# Patient Record
Sex: FEMALE | Employment: UNEMPLOYED | URBAN - METROPOLITAN AREA
[De-identification: names, ages, dates, MRNs, and addresses within clinical notes are randomized per-mention and may not be internally consistent; named-entity substitution may affect disease eponyms.]

---

## 2017-01-09 DIAGNOSIS — Z12.31 VISIT FOR SCREENING MAMMOGRAM: ICD-10-CM

## 2017-06-12 ENCOUNTER — OFFICE VISIT (OUTPATIENT)
Dept: INTERNAL MEDICINE CLINIC | Age: 52
End: 2017-06-12

## 2017-06-12 VITALS
HEIGHT: 63 IN | SYSTOLIC BLOOD PRESSURE: 102 MMHG | BODY MASS INDEX: 20.55 KG/M2 | WEIGHT: 116 LBS | DIASTOLIC BLOOD PRESSURE: 66 MMHG | TEMPERATURE: 98.6 F | OXYGEN SATURATION: 96 % | RESPIRATION RATE: 14 BRPM | HEART RATE: 80 BPM

## 2017-06-12 DIAGNOSIS — Z00.00 ROUTINE GENERAL MEDICAL EXAMINATION AT A HEALTH CARE FACILITY: Primary | ICD-10-CM

## 2017-06-12 DIAGNOSIS — L24.7 CONTACT DERMATITIS AND ECZEMA DUE TO PLANT: ICD-10-CM

## 2017-06-12 DIAGNOSIS — J30.1 SEASONAL ALLERGIC RHINITIS DUE TO POLLEN: ICD-10-CM

## 2017-06-12 DIAGNOSIS — F41.0 PANIC ATTACK: ICD-10-CM

## 2017-06-12 DIAGNOSIS — Z12.11 COLON CANCER SCREENING: ICD-10-CM

## 2017-06-12 RX ORDER — TRIAMCINOLONE ACETONIDE 1 MG/G
CREAM TOPICAL
Qty: 45 G | Refills: 1 | Status: SHIPPED | OUTPATIENT
Start: 2017-06-12 | End: 2019-02-21 | Stop reason: ALTCHOICE

## 2017-06-12 RX ORDER — ALPRAZOLAM 0.5 MG/1
0.5 TABLET ORAL
Qty: 10 TAB | Refills: 0 | Status: SHIPPED | OUTPATIENT
Start: 2017-06-12 | End: 2018-02-26 | Stop reason: ALTCHOICE

## 2017-06-12 RX ORDER — HYDROCORTISONE ACETATE SUPPOSITORY 30 MG/1
1 SUPPOSITORY RECTAL
Qty: 12 SUPPOSITORY | Refills: 1 | Status: SHIPPED | OUTPATIENT
Start: 2017-06-12 | End: 2019-02-21 | Stop reason: ALTCHOICE

## 2017-06-12 NOTE — MR AVS SNAPSHOT
Visit Information Date & Time Provider Department Dept. Phone Encounter #  
 6/12/2017  8:45 AM Aditya Paz MD [address] [address] Internal Medicine [phone] Upcoming Health Maintenance Date Due COLONOSCOPY 10/29/1981 INFLUENZA AGE 9 TO ADULT 8/1/2017 PAP AKA CERVICAL CYTOLOGY 10/27/2018 BREAST CANCER SCRN MAMMOGRAM 12/5/2018 DTaP/Tdap/Td series (2 - Td) 6/6/2026 Allergies as of 6/12/2017  Review Complete On: 6/12/2017 By: Aditya Paz MD  
  
 Severity Noted Reaction Type Reactions Versed [Midazolam]  01/07/2013    Nausea and Vomiting Current Immunizations  Reviewed on 6/6/2016 Name Date Hepatitis A Vaccine 4/19/2007, 10/19/2006 Influenza Vaccine 10/15/2015 Tdap 6/6/2016 Not reviewed this visit You Were Diagnosed With   
  
 Codes Comments Routine general medical examination at a health care facility    -  Primary ICD-10-CM: Z00.00 ICD-9-CM: V70.0 Colon cancer screening     ICD-10-CM: Z12.11 ICD-9-CM: V76.51 Seasonal allergic rhinitis due to pollen     ICD-10-CM: J30.1 ICD-9-CM: 477.0 Contact dermatitis and eczema due to plant     ICD-10-CM: L24.7 ICD-9-CM: 692.6 Panic attack     ICD-10-CM: F41.0 ICD-9-CM: 300.01 Vitals BP Pulse Temp Resp Height(growth percentile) Weight(growth percentile) 102/66 80 98.6 °F (37 °C) (Oral) 14 5' 3.25\" (1.607 m) 116 lb (52.6 kg) LMP SpO2 BMI OB Status Smoking Status 02/13/2012 96% 20.39 kg/m2 Postmenopausal Never Smoker Vitals History BMI and BSA Data Body Mass Index Body Surface Area  
 20.39 kg/m 2 1.53 m 2 Preferred Pharmacy Pharmacy Name Phone [facility] [phone] Your Updated Medication List  
  
   
This list is accurate as of: 6/12/17  9:33 AM.  Always use your most recent med list.  
  
  
  
  
 ALPRAZolam 0.5 mg tablet Commonly known as:  Wellington Nagy Take 1 Tab by mouth three (3) times daily as needed for Anxiety. Max Daily Amount: 1.5 mg. Indications: anxiety FISH OIL 1,000 mg Cap Generic drug:  omega-3 fatty acids-vitamin e Take 1 Cap by mouth. Glucosamine &Chondroit-MV-Min3 939-313-82-0.5 mg Tab Take  by mouth. hydrocortisone 2.5 % rectal cream  
Commonly known as:  ANUSOL-HC Insert  into rectum two (2) times a day. hydrocortisone acetate 30 mg Supp Insert 1 Dose into rectum two (2) times daily as needed. multivitamin tablet Commonly known as:  ONE A DAY Take 1 Tab by mouth daily. triamcinolone acetonide 0.1 % topical cream  
Commonly known as:  KENALOG Apply  to affected area two (2) times daily as needed for Skin Irritation. use thin layer VITAMIN D3 1,000 unit tablet Generic drug:  cholecalciferol Take  by mouth daily. Prescriptions Printed Refills ALPRAZolam (XANAX) 0.5 mg tablet 0 Sig: Take 1 Tab by mouth three (3) times daily as needed for Anxiety. Max Daily Amount: 1.5 mg. Indications: anxiety Class: Print Route: Oral  
  
Prescriptions Sent to Pharmacy Refills  
 triamcinolone acetonide (KENALOG) 0.1 % topical cream 1 Sig: Apply  to affected area two (2) times daily as needed for Skin Irritation. use thin layer Class: Normal  
 Pharmacy: [address] [place] [address] Ph #: [phone] Route: Topical  
 hydrocortisone acetate 30 mg supp 1 Sig: Insert 1 Dose into rectum two (2) times daily as needed. Class: Normal  
 Pharmacy: [address] [place] [address] Ph #: [phone] Route: Rectal  
  
We Performed the Following CBC WITH AUTOMATED DIFF [02892 CPT(R)] LIPID PANEL [62694 CPT(R)] METABOLIC PANEL, COMPREHENSIVE [83936 CPT(R)] REFERRAL TO GASTROENTEROLOGY [RUO01 Custom] TSH RFX ON ABNORMAL TO FREE T4 [[id number] Custom] UA/M W/RFLX CULTURE, ROUTINE [[id number] Custom] Referral Information Referral ID Referred By Referred To  
  
 [id number] [address] [address] [place], [facility] Visits Status Start Date End Date 1 New Request 6/12/17 6/12/18 If your referral has a status of pending review or denied, additional information will be sent to support the outcome of this decision. Introducing [facility] & HEALTH SERVICES! Dear Oscar Choi: Thank you for requesting a Sanguine account. Our records indicate that you already have an active Sanguine account. You can access your account anytime at [email or web address] Did you know that you can access your hospital and ER discharge instructions at any time in Sanguine? You can also review all of your test results from your hospital stay or ER visit. Additional Information If you have questions, please visit the Frequently Asked Questions section of the Sanguine website at [email or web address] Remember, Sanguine is NOT to be used for urgent needs. For medical emergencies, dial 911. Now available from your iPhone and Android! Please provide this summary of care documentation to your next provider. Your primary care clinician is listed as Willy [phone] If you have any questions after today's visit, please call [phone].

## 2017-06-12 NOTE — PROGRESS NOTES
Subjective:   46 y.o. female for Well Woman Check. Her gyne and breast care is done elsewhere by her Ob-Gyne physician. Patient Active Problem List    Diagnosis Date Noted    ACP (advance care planning) 06/06/2016    Allergic rhinitis 01/13/2014     Current Outpatient Prescriptions   Medication Sig Dispense Refill    triamcinolone acetonide (KENALOG) 0.1 % topical cream Apply  to affected area two (2) times daily as needed for Skin Irritation. use thin layer 45 g 1    ALPRAZolam (XANAX) 0.5 mg tablet Take 1 Tab by mouth three (3) times daily as needed for Anxiety. Max Daily Amount: 1.5 mg. Indications: anxiety 10 Tab 0    hydrocortisone acetate 30 mg supp Insert 1 Dose into rectum two (2) times daily as needed. 12 Suppository 1    hydrocortisone (ANUSOL-HC) 2.5 % rectal cream Insert  into rectum two (2) times a day. 30 g 3    cholecalciferol (VITAMIN D3) 1,000 unit tablet Take  by mouth daily.  omega-3 fatty acids-vitamin e (FISH OIL) 1,000 mg cap Take 1 Cap by mouth.  multivitamin (ONE A DAY) tablet Take 1 Tab by mouth daily.  Glucosamine &Chondroit-MV-Min3 223-926-41-0.5 mg tab Take  by mouth.        Allergies   Allergen Reactions    Versed [Midazolam] Nausea and Vomiting     Past Medical History:   Diagnosis Date    Allergic rhinitis, cause unspecified      Past Surgical History:   Procedure Laterality Date    HX REFRACTIVE SURGERY      SD EMBOLIZATION UTERINE FIBROID      REPAIR ACHILLES TENDON,PRIMARY       Family History   Problem Relation Age of Onset    Elevated Lipids Father     Allergic Rhinitis Son      Social History   Substance Use Topics    Smoking status: Never Smoker    Smokeless tobacco: Never Used    Alcohol use No        Lab Results  Component Value Date/Time   WBC 7.9 06/06/2016 09:58 AM   HGB 13.6 06/06/2016 09:58 AM   HCT 41.1 06/06/2016 09:58 AM   PLATELET 695 02/41/0865 09:58 AM   MCV 91 06/06/2016 09:58 AM     Lab Results  Component Value Date/Time Cholesterol, total 162 03/23/2015 10:26 AM   HDL Cholesterol 64 03/23/2015 10:26 AM   LDL, calculated 83 03/23/2015 10:26 AM   Triglyceride 75 03/23/2015 10:26 AM   CHOL/HDL Ratio 2.3 09/13/2010 12:00 PM     Lab Results  Component Value Date/Time   ALT (SGPT) 24 06/06/2016 09:58 AM   AST (SGOT) 25 06/06/2016 09:58 AM   Alk. phosphatase 93 06/06/2016 09:58 AM   Bilirubin, total 0.4 06/06/2016 09:58 AM   Albumin 4.8 06/06/2016 09:58 AM   Protein, total 8.5 06/06/2016 09:58 AM   PLATELET 069 34/76/3838 09:58 AM       Lab Results  Component Value Date/Time   GFR est non-AA 92 06/06/2016 09:58 AM   GFR est  06/06/2016 09:58 AM   Creatinine 0.75 06/06/2016 09:58 AM   BUN 15 06/06/2016 09:58 AM   Sodium 141 06/06/2016 09:58 AM   Potassium 4.3 06/06/2016 09:58 AM   Chloride 100 06/06/2016 09:58 AM   CO2 23 06/06/2016 09:58 AM     Lab Results  Component Value Date/Time   TSH 0.803 06/06/2016 09:58 AM   T4, Free 1.63 01/13/2014 10:57 AM      Lab Results   Component Value Date/Time    Glucose 87 06/06/2016 09:58 AM         Specific concerns today: Some itching on the arms since she was working in the yard last week. Ongoing hemorrhoid issues. , .    Review of Systems  A comprehensive review of systems was negative except for that written in the HPI. Some issues with anxiety off and on. Panic about once per year. Objective:   Blood pressure 102/66, pulse 80, temperature 98.6 °F (37 °C), temperature source Oral, resp. rate 14, height 5' 3.25\" (1.607 m), weight 116 lb (52.6 kg), last menstrual period 02/13/2012, SpO2 96 %.    Physical Examination:   General appearance - alert, well appearing, and in no distress  Eyes - pupils equal and reactive, extraocular eye movements intact  Ears - bilateral TM's and external ear canals normal  Nose - normal and patent, no erythema, discharge or polyps  Mouth - mucous membranes moist, pharynx normal without lesions  Neck - supple, no significant adenopathy  Lymphatics - no palpable lymphadenopathy, no hepatosplenomegaly  Chest - clear to auscultation, no wheezes, rales or rhonchi, symmetric air entry  Heart - normal rate, regular rhythm, normal S1, S2, no murmurs, rubs, clicks or gallops  Abdomen - soft, nontender, nondistended, no masses or organomegaly  Back exam - full range of motion, no tenderness, palpable spasm or pain on motion  Neurological - alert, oriented, normal speech, no focal findings or movement disorder noted, motor and sensory grossly normal bilaterally  Musculoskeletal - no joint tenderness, deformity or swelling  Extremities - peripheral pulses normal, no pedal edema, no clubbing or cyanosis  Skin - normal coloration and turgor, no rashes, no suspicious skin lesions noted  Dry irritated areas on the arms. Assessment/Plan:     continue present plan, routine labs ordered  Mal Fransisca was seen today for complete physical, skin problem and medication problem. Diagnoses and all orders for this visit:    Routine general medical examination at a health care facility  -     CBC WITH AUTOMATED DIFF  -     METABOLIC PANEL, COMPREHENSIVE  -     LIPID PANEL  -     TSH RFX ON ABNORMAL TO FREE T4  -     UA/M W/RFLX CULTURE, ROUTINE  -     REFERRAL TO GASTROENTEROLOGY    Colon cancer screening - long discussion about need to get her colonoscopy done. Seasonal allergic rhinitis due to pollen    Contact dermatitis and eczema due to plant - cream as needed    Panic attack - meds as needed    Other orders  -     triamcinolone acetonide (KENALOG) 0.1 % topical cream; Apply  to affected area two (2) times daily as needed for Skin Irritation. use thin layer  -     ALPRAZolam (XANAX) 0.5 mg tablet; Take 1 Tab by mouth three (3) times daily as needed for Anxiety. Max Daily Amount: 1.5 mg. Indications: anxiety  -     hydrocortisone acetate 30 mg supp; Insert 1 Dose into rectum two (2) times daily as needed. Follow-up Disposition: as needed and 12 months.     Advised her to call back or return to office if symptoms worsen/change/persist.  Discussed expected course/resolution/complications of diagnosis in detail with patient. Medication risks/benefits/costs/interactions/alternatives discussed with patient. She was given an after visit summary which includes diagnoses, current medications, & vitals. She expressed understanding with the diagnosis and plan.

## 2017-06-12 NOTE — PROGRESS NOTES
Chief Complaint   Patient presents with    Complete Physical    Skin Problem     itchy rash bilateral arms     Medication Problem     insurance will not cover Anusol      Goals that were addressed/or need to be completed after this visit:  Health Maintenance Due   Topic Date Due    COLONOSCOPY  10/29/1981     · Referral to colo placed.

## 2017-06-13 LAB
ALBUMIN SERPL-MCNC: 4.5 G/DL (ref 3.5–5.5)
ALBUMIN/GLOB SERPL: 1.3 {RATIO} (ref 1.2–2.2)
ALP SERPL-CCNC: 86 IU/L (ref 39–117)
ALT SERPL-CCNC: 14 IU/L (ref 0–32)
APPEARANCE UR: CLEAR
AST SERPL-CCNC: 19 IU/L (ref 0–40)
BACTERIA #/AREA URNS HPF: NORMAL /[HPF]
BASOPHILS # BLD AUTO: 0 X10E3/UL (ref 0–0.2)
BASOPHILS NFR BLD AUTO: 1 %
BILIRUB SERPL-MCNC: 0.3 MG/DL (ref 0–1.2)
BILIRUB UR QL STRIP: NEGATIVE
BUN SERPL-MCNC: 17 MG/DL (ref 6–24)
BUN/CREAT SERPL: 29 (ref 9–23)
CALCIUM SERPL-MCNC: 9.4 MG/DL (ref 8.7–10.2)
CASTS URNS QL MICRO: NORMAL /LPF
CHLORIDE SERPL-SCNC: 102 MMOL/L (ref 96–106)
CHOLEST SERPL-MCNC: 154 MG/DL (ref 100–199)
CO2 SERPL-SCNC: 23 MMOL/L (ref 18–29)
COLOR UR: YELLOW
CREAT SERPL-MCNC: 0.59 MG/DL (ref 0.57–1)
EOSINOPHIL # BLD AUTO: 0.1 X10E3/UL (ref 0–0.4)
EOSINOPHIL NFR BLD AUTO: 1 %
EPI CELLS #/AREA URNS HPF: NORMAL /HPF
ERYTHROCYTE [DISTWIDTH] IN BLOOD BY AUTOMATED COUNT: 13.3 % (ref 12.3–15.4)
GLOBULIN SER CALC-MCNC: 3.6 G/DL (ref 1.5–4.5)
GLUCOSE SERPL-MCNC: 93 MG/DL (ref 65–99)
GLUCOSE UR QL: NEGATIVE
HCT VFR BLD AUTO: 38.3 % (ref 34–46.6)
HDLC SERPL-MCNC: 60 MG/DL
HGB BLD-MCNC: 12.7 G/DL (ref 11.1–15.9)
HGB UR QL STRIP: NEGATIVE
IMM GRANULOCYTES # BLD: 0 X10E3/UL (ref 0–0.1)
IMM GRANULOCYTES NFR BLD: 0 %
KETONES UR QL STRIP: NEGATIVE
LDLC SERPL CALC-MCNC: 83 MG/DL (ref 0–99)
LEUKOCYTE ESTERASE UR QL STRIP: NEGATIVE
LYMPHOCYTES # BLD AUTO: 1.8 X10E3/UL (ref 0.7–3.1)
LYMPHOCYTES NFR BLD AUTO: 30 %
MCH RBC QN AUTO: 30.5 PG (ref 26.6–33)
MCHC RBC AUTO-ENTMCNC: 33.2 G/DL (ref 31.5–35.7)
MCV RBC AUTO: 92 FL (ref 79–97)
MICRO URNS: NORMAL
MICRO URNS: NORMAL
MONOCYTES # BLD AUTO: 0.5 X10E3/UL (ref 0.1–0.9)
MONOCYTES NFR BLD AUTO: 8 %
MUCOUS THREADS URNS QL MICRO: PRESENT
NEUTROPHILS # BLD AUTO: 3.5 X10E3/UL (ref 1.4–7)
NEUTROPHILS NFR BLD AUTO: 60 %
NITRITE UR QL STRIP: NEGATIVE
PH UR STRIP: 6 [PH] (ref 5–7.5)
PLATELET # BLD AUTO: 330 X10E3/UL (ref 150–379)
POTASSIUM SERPL-SCNC: 4.6 MMOL/L (ref 3.5–5.2)
PROT SERPL-MCNC: 8.1 G/DL (ref 6–8.5)
PROT UR QL STRIP: NEGATIVE
RBC # BLD AUTO: 4.16 X10E6/UL (ref 3.77–5.28)
RBC #/AREA URNS HPF: NORMAL /HPF
SODIUM SERPL-SCNC: 143 MMOL/L (ref 134–144)
SP GR UR: 1.03 (ref 1–1.03)
TRIGL SERPL-MCNC: 57 MG/DL (ref 0–149)
TSH SERPL DL<=0.005 MIU/L-ACNC: 0.55 UIU/ML (ref 0.45–4.5)
URINALYSIS REFLEX, 377202: NORMAL
UROBILINOGEN UR STRIP-MCNC: 0.2 MG/DL (ref 0.2–1)
VLDLC SERPL CALC-MCNC: 11 MG/DL (ref 5–40)
WBC # BLD AUTO: 5.9 X10E3/UL (ref 3.4–10.8)
WBC #/AREA URNS HPF: NORMAL /HPF

## 2018-01-04 ENCOUNTER — TELEPHONE (OUTPATIENT)
Dept: INTERNAL MEDICINE CLINIC | Age: 53
End: 2018-01-04

## 2018-01-04 NOTE — TELEPHONE ENCOUNTER
Patient would like to know who Dr. Ariana Terrell recommends she go to about her continuous nosebleeds -- pt states Dr. Ariana Terrell told her she could have it cauterized. Asked if Miss Ronal Montalvo could call her back.

## 2018-01-19 ENCOUNTER — TELEPHONE (OUTPATIENT)
Dept: INTERNAL MEDICINE CLINIC | Age: 53
End: 2018-01-19

## 2018-01-22 NOTE — TELEPHONE ENCOUNTER
Spoke with pt. Pt is worried that she has been having nose bleeds ~1x month. Usually happens when she is at work. She discussed this with SRJ at her appt and he suggested ENT. Have info for 's Naty [employer] and [employer].

## 2018-02-26 ENCOUNTER — OFFICE VISIT (OUTPATIENT)
Dept: INTERNAL MEDICINE CLINIC | Age: 53
End: 2018-02-26

## 2018-02-26 VITALS
BODY MASS INDEX: 19.84 KG/M2 | HEART RATE: 83 BPM | SYSTOLIC BLOOD PRESSURE: 122 MMHG | DIASTOLIC BLOOD PRESSURE: 74 MMHG | RESPIRATION RATE: 10 BRPM | HEIGHT: 63 IN | TEMPERATURE: 97.6 F | WEIGHT: 112 LBS | OXYGEN SATURATION: 96 %

## 2018-02-26 DIAGNOSIS — R00.2 PALPITATIONS: Primary | ICD-10-CM

## 2018-02-26 DIAGNOSIS — F41.9 ANXIETY: ICD-10-CM

## 2018-02-26 DIAGNOSIS — R04.0 NOSEBLEED: ICD-10-CM

## 2018-02-26 RX ORDER — LORAZEPAM 0.5 MG/1
0.5 TABLET ORAL
Qty: 20 TAB | Refills: 1 | Status: SHIPPED | OUTPATIENT
Start: 2018-02-26 | End: 2018-04-13

## 2018-02-26 RX ORDER — CITALOPRAM 20 MG/1
TABLET, FILM COATED ORAL
Qty: 30 TAB | Refills: 1 | Status: SHIPPED | OUTPATIENT
Start: 2018-02-26 | End: 2018-04-13

## 2018-02-26 NOTE — PROGRESS NOTES
HPI:  Tania Roberson is a 45y.o. year old female who is here for an urgent visit as a walk-in. She walked in the office saying that she was generally not feeling well. For the last 2 weeks or more she is felt more stressed and anxious and worried. She became tearful and cried throughout the interview today. She had a nosebleed about 2 weeks ago and that triggered memories of her father before he  with his blood cancer. She has had one additional nosebleeds since then and she has become increasingly worried about the consequence. She is also worried about her son who is 16. Her  has his own health issues and will not take care of himself. He is taking care of his own bills and not helping her out with bills with her son. She has a tight feeling across her head, palpitations in her heart, but denies any exertional chest pain. She has not been exercising as she had in the past.  She denies suicidal or homicidal ideation. She has been taking some of the Xanax and it does not help at all. She says it makes her feel more down after she takes it. Past Medical History:   Diagnosis Date    Allergic rhinitis, cause unspecified        Past Surgical History:   Procedure Laterality Date    HX REFRACTIVE SURGERY      PA EMBOLIZATION UTERINE FIBROID      REPAIR ACHILLES Rohini Hamburg         Prior to Admission medications    Medication Sig Start Date End Date Taking? Authorizing Provider   LORazepam (ATIVAN) 0.5 mg tablet Take 1 Tab by mouth every eight (8) hours as needed for Anxiety. Max Daily Amount: 1.5 mg. 18  Yes Darold Gaucher III, MD   citalopram (CELEXA) 20 mg tablet 1/2 tab for 4 days then whole tab in the AM. 18  Yes Darold Gaucher III, MD   triamcinolone acetonide (KENALOG) 0.1 % topical cream Apply  to affected area two (2) times daily as needed for Skin Irritation.  use thin layer 17  Yes Darold Gaucher III, MD   hydrocortisone acetate 30 mg supp Insert 1 Dose into rectum two (2) times daily as needed. 6/12/17  Yes Rob Brito III, MD   hydrocortisone (ANUSOL-HC) 2.5 % rectal cream Insert  into rectum two (2) times a day. 6/6/16  Yes Rob Brito III, MD   cholecalciferol (VITAMIN D3) 1,000 unit tablet Take  by mouth daily. Yes Historical Provider   omega-3 fatty acids-vitamin e (FISH OIL) 1,000 mg cap Take 1 Cap by mouth. Yes Historical Provider   multivitamin (ONE A DAY) tablet Take 1 Tab by mouth daily. Yes Historical Provider   Glucosamine &Chondroit-MV-Min3 655-107-31-0.5 mg tab Take  by mouth. Yes Historical Provider       Social History     Social History    Marital status:      Spouse name: N/A    Number of children: N/A    Years of education: N/A     Occupational History    Not on file.      Social History Main Topics    Smoking status: Never Smoker    Smokeless tobacco: Never Used    Alcohol use No    Drug use: No    Sexual activity: Yes     Partners: Male     Other Topics Concern    Not on file     Social History Narrative          ROS  Per HPI    Visit Vitals    /74    Pulse 83    Temp 97.6 °F (36.4 °C) (Oral)    Resp 10    Ht 5' 3.25\" (1.607 m)    Wt 112 lb (50.8 kg)    LMP 02/13/2012    SpO2 96%    BMI 19.68 kg/m2         Physical Exam   Physical Examination: General appearance - anxious and crying  Mouth - mucous membranes moist, pharynx normal without lesions  Neck - supple, no significant adenopathy  Lymphatics - no palpable lymphadenopathy, no hepatosplenomegaly  Chest - clear to auscultation, no wheezes, rales or rhonchi, symmetric air entry  Heart - normal rate, regular rhythm, normal S1, S2, no murmurs, rubs, clicks or gallops  Abdomen - soft, nontender, nondistended, no masses or organomegaly  Neurological - alert, oriented, normal speech, no focal findings or movement disorder noted  EKG: normal EKG, normal sinus rhythm, unchanged from previous tracings    Rob Brito III MD        Assessment/Plan:  Diagnoses and all orders for this visit:    1. Palpitations -she felt she was having these in the office but her EKG was normal.  Likely related to her underlying anxiety. -     CBC W/O DIFF  -     METABOLIC PANEL, COMPREHENSIVE  -     TSH RFX ON ABNORMAL TO FREE T4  -     AMB POC EKG ROUTINE W/ 12 LEADS, INTER & REP    2. Anxiety -with panic disorder. She has had this in the past but it seems to be exacerbated by her recent worries about health issues. For this reason we will change her Xanax to Ativan and also give her a prescription for citalopram.  She will follow-up here in the next month to let me know how she is doing.  -     LORazepam (ATIVAN) 0.5 mg tablet; Take 1 Tab by mouth every eight (8) hours as needed for Anxiety. Max Daily Amount: 1.5 mg.  -     citalopram (CELEXA) 20 mg tablet; 1/2 tab for 4 days then whole tab in the AM.    3. Nosebleed -normal exam today other than some mild erythema of the nasal mucosa. I suggested she use Vaseline, saline nasal spray, and a humidifier. We will also check a CBC and make sure that this is not related to hematologic abnormality. Her previous labs have been normal.  -     CBC W/O DIFF       Follow-up Disposition: 1 month       Advised her to call back or return to office if symptoms worsen/change/persist.  Discussed expected course/resolution/complications of diagnosis in detail with patient. Medication risks/benefits/costs/interactions/alternatives discussed with patient. She was given an after visit summary which includes diagnoses, current medications, & vitals. She expressed understanding with the diagnosis and plan.

## 2018-02-26 NOTE — MR AVS SNAPSHOT
[address] [facility] [address] [facility] 
[phone] Patient: Morales Myles MRN:  [id number] Visit Information Date & Time Provider Department Dept. Phone Encounter #  
 2018  8:45 AM Eugene Varela MD [facility] Internal Medicine [phone] [id number] Your Appointments 2018  9:30 AM  
PHYSICAL with Eugene Varela MD  
[facility] Internal Medicine [address]) Appt Note: cpe  
 [address] [facility] [address] [facility] [address]  
[address] [address] [address] [facility] Upcoming Health Maintenance Date Due COLONOSCOPY 10/29/1981 Influenza Age 5 to Adult 2017 PAP AKA CERVICAL CYTOLOGY 10/27/2018 BREAST CANCER SCRN MAMMOGRAM 2018 DTaP/Tdap/Td series (2 - Td) 2026 Allergies as of 2018  Review Complete On: 2018 By: Eugene Varela MD  
  
 Severity Noted Reaction Type Reactions Versed [Midazolam]  2013    Nausea and Vomiting Current Immunizations  Reviewed on 2016 Name Date Hepatitis A Vaccine 2007, 10/19/2006 Influenza Vaccine 10/15/2015 Tdap 2016 Not reviewed this visit You Were Diagnosed With   
  
 Codes Comments Palpitations    -  Primary ICD-10-CM: R00.2 ICD-9-CM: 785.1 Anxiety     ICD-10-CM: F41.9 ICD-9-CM: 300.00 Nosebleed     ICD-10-CM: R04.0 ICD-9-CM: 421. 7 Vitals BP Pulse Temp Resp Height(growth percentile) Weight(growth percentile) 122/74 83 97.6 °F (36.4 °C) (Oral) 10 5' 3.25\" (1.607 m) 112 lb (50.8 kg) LMP SpO2 BMI OB Status Smoking Status 2012 96% 19.68 kg/m2 Postmenopausal Never Smoker Vitals History BMI and BSA Data Body Mass Index Body Surface Area 19.68 kg/m 2 1.51 m 2 Preferred Pharmacy Pharmacy Name Phone  [facility] [address] [facility] [phone] Your Updated Medication List  
  
   
This list is accurate as of 18  8:47 AM.  Always use your most recent med list.  
  
  
  
  
 citalopram 20 mg tablet Commonly known as:  Marshall Bright  
1/2 tab for 4 days then whole tab in the AM. FISH OIL 1,000 mg Cap Generic drug:  omega-3 fatty acids-vitamin e Take 1 Cap by mouth. Glucosamine &Chondroit-MV-Min3 220-012-76-0.5 mg Tab Take  by mouth. hydrocortisone 2.5 % rectal cream  
Commonly known as:  ANUSOL-HC Insert  into rectum two (2) times a day. hydrocortisone acetate 30 mg Supp Insert 1 Dose into rectum two (2) times daily as needed. LORazepam 0.5 mg tablet Commonly known as:  ATIVAN Take 1 Tab by mouth every eight (8) hours as needed for Anxiety. Max Daily Amount: 1.5 mg.  
  
 multivitamin tablet Commonly known as:  ONE A DAY Take 1 Tab by mouth daily. triamcinolone acetonide 0.1 % topical cream  
Commonly known as:  KENALOG Apply  to affected area two (2) times daily as needed for Skin Irritation. use thin layer VITAMIN D3 1,000 unit tablet Generic drug:  cholecalciferol Take  by mouth daily. Prescriptions Printed Refills LORazepam (ATIVAN) 0.5 mg tablet 1 Sig: Take 1 Tab by mouth every eight (8) hours as needed for Anxiety. Max Daily Amount: 1.5 mg.  
 Class: Print Route: Oral  
  
Prescriptions Sent to Pharmacy Refills  
 citalopram (CELEXA) 20 mg tablet 1 Si/2 tab for 4 days then whole tab in the AM. Class: Normal  
 [facility] Ph #: [phone] We Performed the Following AMB POC EKG ROUTINE W/ 12 LEADS, INTER & REP [85080 CPT(R)] CBC W/O DIFF [84759 CPT(R)] METABOLIC PANEL, COMPREHENSIVE [25146 CPT(R)] TSH RFX ON ABNORMAL TO FREE T4 [[id number] Custom] Introducing [facility]! Dear Lara Lucero: Thank you for requesting a Your[employer] account. Our records indicate that you already have an active Your[employer] account. You can access your account anytime at [email or web address] Did you know that you can access your hospital and ER discharge instructions at any time in Your[employer]? You can also review all of your test results from your hospital stay or ER visit. Additional Information If you have questions, please visit the Frequently Asked Questions section of the Your[employer] website at [email or web address] Remember, Your[employer] is NOT to be used for urgent needs. For medical emergencies, dial 911. Now available from your iPhone and Android! Please provide this summary of care documentation to your next provider. Your primary care clinician is listed as Willy [phone] If you have any questions after today's visit, please call [phone].

## 2018-04-02 ENCOUNTER — OFFICE VISIT (OUTPATIENT)
Dept: INTERNAL MEDICINE CLINIC | Age: 53
End: 2018-04-02

## 2018-04-02 VITALS
RESPIRATION RATE: 16 BRPM | WEIGHT: 109.2 LBS | BODY MASS INDEX: 19.35 KG/M2 | DIASTOLIC BLOOD PRESSURE: 80 MMHG | HEART RATE: 85 BPM | OXYGEN SATURATION: 95 % | HEIGHT: 63 IN | SYSTOLIC BLOOD PRESSURE: 100 MMHG | TEMPERATURE: 97.8 F

## 2018-04-02 DIAGNOSIS — F41.9 ANXIETY: ICD-10-CM

## 2018-04-02 DIAGNOSIS — R25.1 TREMOR: Primary | ICD-10-CM

## 2018-04-02 RX ORDER — ZOLPIDEM TARTRATE 10 MG/1
TABLET ORAL
Refills: 0 | COMMUNITY
Start: 2018-03-29 | End: 2018-04-13

## 2018-04-02 NOTE — PROGRESS NOTES
HPI:  Dianne Clarke is a 45y.o. year old female who is here for a routine visit:    Presents for follow-up visit for recent issues with anxiety. She did not start taking the citalopram as she did not understand that it was for anxiety and thought it was all for depression. She has been using lorazepam at night which gives her about 4 hours of sleep. She took one dose of Ambien and did not sleep through the night and called it a failure. She feels jittery and shaky and trembley as well as feels like her stomach is rolling at night. Her appetite somewhat decreased. Her weight is down. Denies vomiting. Denies fevers or chills. Denies any melena or hematochezia. She denies feeling depressed. Is concerned that there is something physically wrong with her that is causing her to feel this way. Past Medical History:   Diagnosis Date    Allergic rhinitis, cause unspecified        Past Surgical History:   Procedure Laterality Date    HX REFRACTIVE SURGERY      MO EMBOLIZATION UTERINE FIBROID      REPAIR ACHILLES Marlyse Nicole         Prior to Admission medications    Medication Sig Start Date End Date Taking? Authorizing Provider   zolpidem (AMBIEN) 10 mg tablet TAKE 1/2 TO 1 TABLET BY MOUTH AT BEDTIME IF NEEDED 3/29/18  Yes Historical Provider   LORazepam (ATIVAN) 0.5 mg tablet Take 1 Tab by mouth every eight (8) hours as needed for Anxiety. Max Daily Amount: 1.5 mg. 2/26/18  Yes Harvey Degroot III, MD   citalopram (CELEXA) 20 mg tablet 1/2 tab for 4 days then whole tab in the AM. 2/26/18  Yes Harvey Degroot III, MD   triamcinolone acetonide (KENALOG) 0.1 % topical cream Apply  to affected area two (2) times daily as needed for Skin Irritation. use thin layer 6/12/17  Yes Gabriel Miller MD   cholecalciferol (VITAMIN D3) 1,000 unit tablet Take  by mouth daily. Yes Historical Provider   omega-3 fatty acids-vitamin e (FISH OIL) 1,000 mg cap Take 1 Cap by mouth.    Yes Historical Provider multivitamin (ONE A DAY) tablet Take 1 Tab by mouth daily. Yes Historical Provider   Glucosamine &Chondroit-MV-Min3 583-727-64-0.5 mg tab Take  by mouth. Yes Historical Provider   hydrocortisone acetate 30 mg supp Insert 1 Dose into rectum two (2) times daily as needed. 6/12/17   Tayler Montaño III, MD   hydrocortisone (ANUSOL-HC) 2.5 % rectal cream Insert  into rectum two (2) times a day. 6/6/16   Arleth Sims MD       Social History     Social History    Marital status:      Spouse name: N/A    Number of children: N/A    Years of education: N/A     Occupational History    Not on file. Social History Main Topics    Smoking status: Never Smoker    Smokeless tobacco: Never Used    Alcohol use No    Drug use: No    Sexual activity: Yes     Partners: Male     Other Topics Concern    Not on file     Social History Narrative          ROS  Per [facility]    Visit Vitals    /80 (BP 1 Location: Right arm, BP Patient Position: Sitting)    Pulse 85    Temp 97.8 °F (36.6 °C) (Oral)    Resp 16    Ht 5' 3.25\" (1.607 m)    Wt 109 lb 3.2 oz (49.5 kg)    LMP 02/13/2012    SpO2 95%    BMI 19.19 kg/m2         Physical Exam   Physical Examination: General appearance - alert, well appearing, and in no distress  Neck - supple, no significant adenopathy, thyroid exam: thyroid is normal in size without nodules or tenderness  Lymphatics - no palpable lymphadenopathy, no hepatosplenomegaly  Chest - clear to auscultation, no wheezes, rales or rhonchi, symmetric air entry  Heart - normal rate, regular rhythm, normal S1, S2, no murmurs, rubs, clicks or gallops  Abdomen - soft, nontender, nondistended, no masses or organomegaly  Extremities - peripheral pulses normal, no pedal edema, no clubbing or cyanosis  Normal strength and reflexes. Assessment/Plan:  Diagnoses and all orders for this visit:    1. Tremor -? Due to anxiety versus a physiological cause like hyperthyroidism.   Will repeat her thyroid function test and electrolytes today to be sure that these are stable. If these are negative she agreed that she consider taking the citalopram.  -     TSH AND FREE T4  -     METABOLIC PANEL, COMPREHENSIVE  -     CBC WITH AUTOMATED DIFF    2. Anxiety -long discussion today about the worries of habituating drugs like lorazepam and Ambien. She will continue to use those just at night for now. If the above is negative we will have her try the citalopram we previously discussed. Follow-up Disposition: 4-6 weeks. Advised her to call back or return to office if symptoms worsen/change/persist.  Discussed expected course/resolution/complications of diagnosis in detail with patient. Medication risks/benefits/costs/interactions/alternatives discussed with patient. She was given an after visit summary which includes diagnoses, current medications, & vitals. She expressed understanding with the diagnosis and plan.

## 2018-04-03 LAB
ALBUMIN SERPL-MCNC: 4.5 G/DL (ref 3.5–5.5)
ALBUMIN/GLOB SERPL: 1.4 {RATIO} (ref 1.2–2.2)
ALP SERPL-CCNC: 71 IU/L (ref 39–117)
ALT SERPL-CCNC: 18 IU/L (ref 0–32)
AST SERPL-CCNC: 22 IU/L (ref 0–40)
BASOPHILS # BLD AUTO: 0 X10E3/UL (ref 0–0.2)
BASOPHILS NFR BLD AUTO: 0 %
BILIRUB SERPL-MCNC: 0.5 MG/DL (ref 0–1.2)
BUN SERPL-MCNC: 13 MG/DL (ref 6–24)
BUN/CREAT SERPL: 19 (ref 9–23)
CALCIUM SERPL-MCNC: 9.7 MG/DL (ref 8.7–10.2)
CHLORIDE SERPL-SCNC: 98 MMOL/L (ref 96–106)
CO2 SERPL-SCNC: 29 MMOL/L (ref 18–29)
CREAT SERPL-MCNC: 0.7 MG/DL (ref 0.57–1)
EOSINOPHIL # BLD AUTO: 0 X10E3/UL (ref 0–0.4)
EOSINOPHIL NFR BLD AUTO: 1 %
ERYTHROCYTE [DISTWIDTH] IN BLOOD BY AUTOMATED COUNT: 13.4 % (ref 12.3–15.4)
GFR SERPLBLD CREATININE-BSD FMLA CKD-EPI: 114 ML/MIN/1.73
GFR SERPLBLD CREATININE-BSD FMLA CKD-EPI: 99 ML/MIN/1.73
GLOBULIN SER CALC-MCNC: 3.3 G/DL (ref 1.5–4.5)
GLUCOSE SERPL-MCNC: 91 MG/DL (ref 65–99)
HCT VFR BLD AUTO: 39.3 % (ref 34–46.6)
HGB BLD-MCNC: 13.4 G/DL (ref 11.1–15.9)
IMM GRANULOCYTES # BLD: 0 X10E3/UL (ref 0–0.1)
IMM GRANULOCYTES NFR BLD: 0 %
LYMPHOCYTES # BLD AUTO: 1.6 X10E3/UL (ref 0.7–3.1)
LYMPHOCYTES NFR BLD AUTO: 26 %
MCH RBC QN AUTO: 31.3 PG (ref 26.6–33)
MCHC RBC AUTO-ENTMCNC: 34.1 G/DL (ref 31.5–35.7)
MCV RBC AUTO: 92 FL (ref 79–97)
MONOCYTES # BLD AUTO: 0.4 X10E3/UL (ref 0.1–0.9)
MONOCYTES NFR BLD AUTO: 7 %
NEUTROPHILS # BLD AUTO: 4.1 X10E3/UL (ref 1.4–7)
NEUTROPHILS NFR BLD AUTO: 66 %
PLATELET # BLD AUTO: 303 X10E3/UL (ref 150–379)
POTASSIUM SERPL-SCNC: 4.6 MMOL/L (ref 3.5–5.2)
PROT SERPL-MCNC: 7.8 G/DL (ref 6–8.5)
RBC # BLD AUTO: 4.28 X10E6/UL (ref 3.77–5.28)
SODIUM SERPL-SCNC: 139 MMOL/L (ref 134–144)
T4 FREE SERPL-MCNC: 2.07 NG/DL (ref 0.82–1.77)
TSH SERPL DL<=0.005 MIU/L-ACNC: 0.56 UIU/ML (ref 0.45–4.5)
WBC # BLD AUTO: 6.1 X10E3/UL (ref 3.4–10.8)

## 2018-04-06 ENCOUNTER — TELEPHONE (OUTPATIENT)
Dept: INTERNAL MEDICINE CLINIC | Age: 53
End: 2018-04-06

## 2018-04-11 NOTE — PROGRESS NOTES
Spoke with pt in ref to results. Pt is in agreement to see Endo. Sent message to Ely with [facility] to see if she can help get appt ASAP.  Will call pt back with appt info

## 2018-04-13 ENCOUNTER — OFFICE VISIT (OUTPATIENT)
Dept: ENDOCRINOLOGY | Age: 53
End: 2018-04-13

## 2018-04-13 VITALS
BODY MASS INDEX: 19.31 KG/M2 | HEIGHT: 63 IN | SYSTOLIC BLOOD PRESSURE: 96 MMHG | HEART RATE: 85 BPM | WEIGHT: 109 LBS | DIASTOLIC BLOOD PRESSURE: 71 MMHG

## 2018-04-13 DIAGNOSIS — E05.80 OTHER THYROTOXICOSIS WITHOUT THYROTOXIC CRISIS OR STORM: Primary | ICD-10-CM

## 2018-04-13 DIAGNOSIS — R94.6 ABNORMAL THYROID FUNCTION TEST: ICD-10-CM

## 2018-04-13 DIAGNOSIS — Z78.0 MENOPAUSE: ICD-10-CM

## 2018-04-13 NOTE — ACP (ADVANCE CARE PLANNING)
Patient complains of increased anxiety and difficulty sleeping that started 6 weeks ago. Sleeping has improved, but is still not to baseline.

## 2018-04-13 NOTE — MR AVS SNAPSHOT
[address] [place] 
[phone] Patient: Tod Saunders MRN:  [id number] Visit Information Date & Time Provider Department Dept. Phone Encounter #  
 2018  8:50 AM Gabi Myers, [address] [facility] [phone] [id number] Your Appointments 2018  9:30 AM  
PHYSICAL with Valerie Childs MD  
[facility] Appt Note: cpe  
 [address] [facility] [address]  
[id number] [place] Upcoming Health Maintenance Date Due COLONOSCOPY 10/29/1981 Influenza Age 5 to Adult 2017 PAP AKA CERVICAL CYTOLOGY 10/27/2018 BREAST CANCER SCRN MAMMOGRAM 2018 DTaP/Tdap/Td series (2 - Td) 2026 Allergies as of 2018  Review Complete On: 2018 By: Valerie Childs MD  
  
 Severity Noted Reaction Type Reactions Versed [Midazolam]  2013    Nausea and Vomiting Current Immunizations  Reviewed on 2016 Name Date Hepatitis A Vaccine 2007, 10/19/2006 Influenza Vaccine 10/15/2015 Tdap 2016 Not reviewed this visit You Were Diagnosed With   
  
 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  
 96/71 85 5' 3.25\" (1.607 m) 109 lb (49.4 kg) 2012 19.16 kg/m2 OB Status Smoking Status Postmenopausal Never Smoker Vitals History BMI and BSA Data Body Mass Index Body Surface Area  
 19.16 kg/m 2 1.48 m 2 Preferred Pharmacy Pharmacy Name Phone [facility] [phone] Your Updated Medication List  
  
   
This list is accurate as of 4/13/18 10:21 AM.  Always use your most recent med list.  
  
  
  
  
 FISH OIL 1,000 mg Cap Generic drug:  omega-3 fatty acids-vitamin e Take 1 Cap by mouth. Glucosamine &Chondroit-MV-Min3 557-744-69-0.5 mg Tab Take  by mouth. hydrocortisone acetate 30 mg Supp Insert 1 Dose into rectum two (2) times daily as needed. multivitamin tablet Commonly known as:  ONE A DAY Take 1 Tab by mouth daily. OTHER Natural estrogen supplement  
  
 triamcinolone acetonide 0.1 % topical cream  
Commonly known as:  KENALOG Apply  to affected area two (2) times daily as needed for Skin Irritation. use thin layer VITAMIN D3 1,000 unit tablet Generic drug:  cholecalciferol Take  by mouth daily. We Performed the Following [facility] AND LH [12933 CPT(R)] IGF-1 Q966371 CPT(R)] PROLACTIN [00666 CPT(R)] T3, FREE [id number] CPT(R)] T4, FREE [id number] CPT(R)] THYROID PEROXIDASE (TPO) AB [32405 CPT(R)] TSH 3RD GENERATION [69405 CPT(R)] TSH RECEPTOR AB [16234 CPT(R)] Patient Instructions Abnormal thyroid tests: 
 
Thyroid hormone high, but TSH is not low. TSH should be low with excess thyroid hormone, so there is a discrepancy. Will evaluate for autoimmune thyroid hormone problems Will also check some labs to evaluate pituitary function Introducing [facility]! Dear Bebe Posadas: Thank you for requesting a Quick TV account. Our records indicate that you already have an active Quick TV account. You can access your account anytime at [email or web address] Did you know that you can access your hospital and ER discharge instructions at any time in Quick TV? You can also review all of your test results from your hospital stay or ER visit. Additional Information If you have questions, please visit the Frequently Asked Questions section of the [email or web address] website at [email or web address] Remember, Nu-B-2B is NOT to be used for urgent needs. For medical emergencies, dial 911. Now available from your iPhone and Android! Please provide this summary of care documentation to your next provider. Your primary care clinician is listed as Willy [phone] If you have any questions after today's visit, please call [phone].

## 2018-04-13 NOTE — PATIENT INSTRUCTIONS
Abnormal thyroid tests:    Thyroid hormone high, but TSH is not low. TSH should be low with excess thyroid hormone, so there is a discrepancy.     Will evaluate for autoimmune thyroid hormone problems  Will also check some labs to evaluate pituitary function

## 2018-04-13 NOTE — PROGRESS NOTES
Chief Complaint   Patient presents with    Thyroid Problem    New Patient     History of Present Illness: Ignacia Braga is a 45 y.o. female who presents for evaluation of abnormal thyroid function  TSH was low normal with frankly high free T4    Reports having abnormal thyroid function during a pregnancy. She was not treated, but monitored for mild hyperthyroidism  After pregnancy thyroid function returned to normal.     She reports feeling well generally until around time of winter olympics (2/2018). Started feeling very anxious, started having trouble sleeping. Started having anxiety attacks. Has nose bleeds with anxiety attacks. Father had acute leukemia and had nose bleeds prior to dying ,so she started recalling this and having more anxiousness thinking this could have happened to her. Was prescribed Xanax, but did not feel well. Benadryl, which previously helped, but does not work now for sleep. Melatonin did not help. Lorazepam is helping with sleep. Was having palpitations. Having exercise intolerance. Losing weight without trying. She thinks she is eating the amount of food. Started having indigestion problems. BMs more loose and having more gas. Does have some heat intolerance. Social:   Occupation: . She is originally from [place] [place]  Past Medical History:   Diagnosis Date    Allergic rhinitis, cause unspecified      Past Surgical History:   Procedure Laterality Date    HX REFRACTIVE SURGERY      AL EMBOLIZATION UTERINE FIBROID      REPAIR ACHILLES TENDON,PRIMARY       Current Outpatient Prescriptions   Medication Sig    OTHER Natural estrogen supplement    zolpidem (AMBIEN) 10 mg tablet TAKE 1/2 TO 1 TABLET BY MOUTH AT BEDTIME IF NEEDED    triamcinolone acetonide (KENALOG) 0.1 % topical cream Apply  to affected area two (2) times daily as needed for Skin Irritation.  use thin layer    hydrocortisone acetate 30 mg supp Insert 1 Dose into rectum two (2) times daily as needed.  cholecalciferol (VITAMIN D3) 1,000 unit tablet Take  by mouth daily.  omega-3 fatty acids-vitamin e (FISH OIL) 1,000 mg cap Take 1 Cap by mouth.  multivitamin (ONE A DAY) tablet Take 1 Tab by mouth daily.  Glucosamine &Chondroit-MV-Min3 374-133-20-0.5 mg tab Take  by mouth.  LORazepam (ATIVAN) 0.5 mg tablet Take 1 Tab by mouth every eight (8) hours as needed for Anxiety. Max Daily Amount: 1.5 mg.    citalopram (CELEXA) 20 mg tablet 1/2 tab for 4 days then whole tab in the AM.    hydrocortisone (ANUSOL-HC) 2.5 % rectal cream Insert  into rectum two (2) times a day. No current facility-administered medications for this visit. Allergies   Allergen Reactions    Versed [Midazolam] Nausea and Vomiting     Family History   Problem Relation Age of Onset    Elevated Lipids Father     Allergic Rhinitis Son      Social History     Social History    Marital status:      Spouse name: N/A    Number of children: N/A    Years of education: N/A     Occupational History    Not on file. Social History Main Topics    Smoking status: Never Smoker    Smokeless tobacco: Never Used    Alcohol use No    Drug use: No    Sexual activity: Yes     Partners: Male     Other Topics Concern    Not on file     Social History Narrative     Review of Systems:  - Constitutional Symptoms: no fevers, chills, she has had some unintentional weight loss of about 5 pounds. - Eyes: no blurry vision or double vision  - Cardiovascular: no chest pain.   See HPI  - Respiratory: no cough or shortness of breath  - Gastrointestinal: See HPI  - Musculoskeletal: no joint pains or weakness  - Integumentary: no rashes  - Neurological: no numbness, tingling, or headaches  - Psychiatric: See HPI  - Endocrine: no heat or cold intolerance, no polyuria or polydipsia    Physical Examination:  Blood pressure 96/71, pulse 85, height 5' 3.25\" (1.607 m), weight 109 lb (49.4 kg), last menstrual period 02/13/2012.  - General: pleasant, well-groomed, no distress, good eye contact  - HEENT: no exopthalmos, no periorbital edema, no scleral/conjunctival injection, No lid lag or stare,  EOMI, MMM, good dentition  - Neck: Gland is palpable, not particularly enlarged. May be a little more firm. No masses, lymph nodes, or carotid bruits  - Cardiovascular: regular, normal rate  - Respiratory: Normal effort  - Integumentary:  no rashes, no edema  - Neurological: Alert, no tremor  - Psychiatric: normal mood and affect    Data Reviewed:   Component      Latest Ref Rng & Units 4/2/2018 6/12/2017 6/6/2016 3/23/2015           9:34 AM 12:00 AM  9:58 AM 10:26 AM   TSH      0.450 - 4.500 uIU/mL 0.564 0.550 0.803 0.728   T4, Free      0.82 - 1.77 ng/dL 2.07 (H)        Component      Latest Ref Rng & Units 1/13/2014 1/13/2014 1/7/2013 1/7/2013          10:57 AM 10:57 AM  3:52 PM  3:52 PM   TSH      0.450 - 4.500 uIU/mL  0.529  0.879   T4, Free      0.82 - 1.77 ng/dL 1.63  1.39      Component      Latest Ref Rng & Units 2/27/2012 2/27/2012          10:36 AM 10:36 AM   TSH      0.450 - 4.500 uIU/mL  0.445 (L)   T4, Free      0.82 - 1.77 ng/dL 1.60      Assessment/Plan:   1. Other thyrotoxicosis without thyrotoxic crisis or storm   Reviewed labs and how they are most fitting with pituitary dysfunction, however they could represent a period of significant flux in thyroid hormone levels. There may have been a mild hyperthyroidism and free T4 levels have improved, and are out-pacing TSH response. 2. Abnormal thyroid function test   See above. Checking for autoimmune thyroid disease, such as Hashimoto's thyroiditis and Graves' disease. Antibodies ordered   3. Menopause   Due to the potential for pituitary dysfunction. Will assess and gonadotropins, which should be elevated in menopause   Greater than 50% of 45 minute visit was spent counseling the patient about above.     Patient Instructions   Abnormal thyroid tests:    Thyroid hormone high, but TSH is not low. TSH should be low with excess thyroid hormone, so there is a discrepancy.     Will evaluate for autoimmune thyroid hormone problems  Will also check some labs to evaluate pituitary function     Follow-up will depend on labs  Copy sent to:

## 2018-04-18 LAB
FSH SERPL-ACNC: 80.8 MIU/ML
IGF-I SERPL-MCNC: 217 NG/ML
LH SERPL-ACNC: 41.5 MIU/ML
PROLACTIN SERPL-MCNC: 5.3 NG/ML (ref 4.8–23.3)
T3FREE SERPL-MCNC: 3.7 PG/ML (ref 2–4.4)
T4 FREE SERPL-MCNC: 2.11 NG/DL (ref 0.82–1.77)
THYROPEROXIDASE AB SERPL-ACNC: 27 IU/ML (ref 0–34)
TSH RECEP AB SER-ACNC: 0.6 IU/L (ref 0–1.75)
TSH SERPL DL<=0.005 MIU/L-ACNC: 0.47 UIU/ML (ref 0.45–4.5)

## 2018-04-19 ENCOUNTER — TELEPHONE (OUTPATIENT)
Dept: ENDOCRINOLOGY | Age: 53
End: 2018-04-19

## 2018-04-19 NOTE — TELEPHONE ENCOUNTER
----- Message from Briseyda Davis sent at 4/19/2018  2:35 PM EDT -----  Regarding: Dr Cedrick Carr  Pt would like a call back regarding her lab work    Contact [phone]

## 2018-04-20 RX ORDER — METHIMAZOLE 5 MG/1
2.5 TABLET ORAL DAILY
Qty: 30 TAB | Refills: 2 | Status: SHIPPED | OUTPATIENT
Start: 2018-04-20 | End: 2019-02-21 | Stop reason: ALTCHOICE

## 2018-04-24 NOTE — TELEPHONE ENCOUNTER
Called her back and reviewed results    She may have mild Graves disease. Pituitary function appears normal.  Recommended thyroid uptake scan, but this maybe very expensive for her. Will start methimazole 2.5 mg daily and reassess labs in 3 months.     (date of call 4/20/2018)

## 2018-06-18 ENCOUNTER — OFFICE VISIT (OUTPATIENT)
Dept: INTERNAL MEDICINE CLINIC | Age: 53
End: 2018-06-18

## 2018-06-18 VITALS
RESPIRATION RATE: 16 BRPM | SYSTOLIC BLOOD PRESSURE: 110 MMHG | BODY MASS INDEX: 19.6 KG/M2 | HEIGHT: 63 IN | TEMPERATURE: 98.7 F | WEIGHT: 110.6 LBS | OXYGEN SATURATION: 98 % | DIASTOLIC BLOOD PRESSURE: 78 MMHG | HEART RATE: 79 BPM

## 2018-06-18 DIAGNOSIS — J30.1 SEASONAL ALLERGIC RHINITIS DUE TO POLLEN: ICD-10-CM

## 2018-06-18 DIAGNOSIS — Z23 ENCOUNTER FOR IMMUNIZATION: ICD-10-CM

## 2018-06-18 DIAGNOSIS — E05.90 HYPERTHYROIDISM: ICD-10-CM

## 2018-06-18 DIAGNOSIS — Z12.11 COLON CANCER SCREENING: ICD-10-CM

## 2018-06-18 DIAGNOSIS — Z00.00 ROUTINE GENERAL MEDICAL EXAMINATION AT A HEALTH CARE FACILITY: Primary | ICD-10-CM

## 2018-06-18 NOTE — MR AVS SNAPSHOT
[address] [address] 
[phone] Patient: Levi Bardales MRN:  [id number] Visit Information Date & Time Provider Department Dept. Phone Encounter #  
 2018  9:30 AM Manisha Fu MD [facility] [phone] [id number] Upcoming Health Maintenance Date Due COLONOSCOPY 10/29/1981 Influenza Age 5 to Adult 2018 PAP AKA CERVICAL CYTOLOGY 10/27/2018 BREAST CANCER SCRN MAMMOGRAM 2018 DTaP/Tdap/Td series (2 - Td) 2026 Allergies as of 2018  Review Complete On: 2018 By: Yumiko Oconnell LPN Severity Noted Reaction Type Reactions Versed [Midazolam]  2013    Nausea and Vomiting Current Immunizations  Reviewed on 2016 Name Date Hepatitis A Vaccine 2007, 10/19/2006 Influenza Vaccine 10/15/2015 Tdap 2016 Not reviewed this visit You Were Diagnosed With   
  
 Codes Comments Routine general medical examination at a health care facility    -  Primary ICD-10-CM: Z00.00 ICD-9-CM: V70.0 Encounter for immunization     ICD-10-CM: V94 ICD-9-CM: V03.89 Colon cancer screening     ICD-10-CM: Z12.11 ICD-9-CM: V76.51 Seasonal allergic rhinitis due to pollen     ICD-10-CM: J30.1 ICD-9-CM: 477.0 Hyperthyroidism     ICD-10-CM: E05.90 ICD-9-CM: 242.90 Vitals BP Pulse Temp Resp Height(growth percentile) Weight(growth percentile) 110/78 79 98.7 °F (37.1 °C) (Oral) 16 5' 2.75\" (1.594 m) 110 lb 9.6 oz (50.2 kg) LMP SpO2 BMI OB Status Smoking Status 2012 98% 19.75 kg/m2 Postmenopausal Never Smoker Vitals History BMI and BSA Data Body Mass Index Body Surface Area 19.75 kg/m 2 1.49 m 2 Preferred Pharmacy Pharmacy Name Phone [address] [address] [phone] Your Updated Medication List  
 This list is accurate as of 18 10:05 AM.  Always use your most recent med list.  
  
  
  
  
 FISH OIL 1,000 mg Cap Generic drug:  omega-3 fatty acids-vitamin e Take 1 Cap by mouth two (2) times a week. Glucosamine &Chondroit-MV-Min3 290-392-47-0.5 mg Tab Take  by mouth. hydrocortisone acetate 30 mg Supp Insert 1 Dose into rectum two (2) times daily as needed. methIMAzole 5 mg tablet Commonly known as:  TAPAZOLE Take 0.5 Tabs by mouth daily. multivitamin tablet Commonly known as:  ONE A DAY Take 1 Tab by mouth daily. OTHER Natural estrogen supplement  
  
 triamcinolone acetonide 0.1 % topical cream  
Commonly known as:  KENALOG Apply  to affected area two (2) times daily as needed for Skin Irritation. use thin layer  
  
 varicella-zoster recombinant (PF) 50 mcg/0.5 mL Susr injection Commonly known as:  SHINGRIX (PF)  
0.5 mL by IntraMUSCular route once for 1 dose. VITAMIN D3 1,000 unit tablet Generic drug:  cholecalciferol Take  by mouth daily. Prescriptions Printed Refills  
 varicella-zoster recombinant, PF, (SHINGRIX, PF,) 50 mcg/0.5 mL susr injection 1 Si.5 mL by IntraMUSCular route once for 1 dose. Class: Print Route: IntraMUSCular We Performed the Following LIPID PANEL [72898 CPT(R)] REFERRAL TO GASTROENTEROLOGY [TXQ08 Custom] TSH AND FREE T4 [26754 CPT(R)] UA/M W/RFLX CULTURE, ROUTINE [[id number] Custom] Referral Information Referral ID Referred By Referred To  
  
 [id number] Counts include 234 beds at the [facility], [address] Visits Status Start Date End Date 1 New Request 18 If your referral has a status of pending review or denied, additional information will be sent to support the outcome of this decision. Patient Instructions As discussed in your appointment today, [address] is an important part of planning for your healthcare future. Discussing your preferences with your family and your care team is a part of good healthcare so that we can be guided by your known values and goals. Our office offers this service for you at your convenience. Our Nurse Navigators and certified Respecting Choices ® Facilitators, Lan Wright and Manson Gitelman typically schedule family appointments for this service on Wednesdays. To schedule an Advance Care Planning visit or to receive more information about this service, please call [facility] Internal Medicine at [phone] and ask to speak directly to Nancy Mercado or [employer]. Advance Care Planning: Care Instructions Your Care Instructions It can be hard to live with an illness that cannot be cured. But if your health is getting worse, you may want to make decisions about end-of-life care. Planning for the end of your life does not mean that you are giving up. It is a way to make sure that your wishes are met. Clearly stating your wishes can make it easier for your loved ones. Making plans while you are still able may also ease your mind and make your final days less stressful and more meaningful. Follow-up care is a key part of your treatment and safety. Be sure to make and go to all appointments, and call your doctor if you are having problems. It's also a good idea to know your test results and keep a list of the medicines you take. What can you do to plan for the end of life? · You can bring these issues up with your doctor. You do not need to wait until your doctor starts the conversation. You might start with \"I would not be willing to live with . Tali Abbott \" When you complete this sentence it helps your doctor understand your wishes. · Talk openly and honestly with your doctor. This is the best way to understand the decisions you will need to make as your health changes. Know that you can always change your mind. · Ask your doctor about commonly used life-support measures. These include tube feedings, breathing machines, and fluids given through a vein (IV). Understanding these treatments will help you decide whether you want them. · You may choose to have these life-supporting treatments for a limited time. This allows a trial period to see whether they will help you. You may also decide that you want your doctor to take only certain measures to keep you alive. It is important to spell out these conditions so that your doctor and family understand them. · Talk to your doctor about how long you are likely to live. He or she may be able to give you an idea of what usually happens with your specific illness. · Think about preparing papers that state your wishes. This way there will not be any confusion about what you want. You can change your instructions at any time. Which papers should you prepare? Advance directives are legal papers that tell doctors how you want to be cared for at the end of your life. You do not need a  to write these papers. Ask your doctor or your state health department for information on how to write your advance directives. They may have the forms for each of these types of papers. Make sure your doctor has a copy of these on file, and give a copy to a family member or close friend. · Consider a do-not-resuscitate order (DNR). This order asks that no extra treatments be done if your heart stops or you stop breathing. Extra treatments may include electrical shock to restart your heart or a machine to breathe for you. If you decide to have a DNR order, ask your doctor to explain and write it. Place the order in your home where everyone can easily see it. · Consider a living will. A living will explains your wishes in case you are in a coma or cannot communicate.  Living cain tell doctors to use or not use treatments that would keep you alive. You must have one or two witnesses or a notary present when you sign this form. · Consider a durable power of . This allows you to name a person to make decisions about your care if you are not able to. Most people ask a close friend or family member. Talk to this person about the kinds of treatments you want and those that you do not want. Make sure this person understands your wishes. If this person is not the health care agent named in your advance directive, also talk with your health care agent. These legal papers are simple to change. Tell your doctor what you want to change, and ask him or her to make a note in your medical file. Give your family updated copies of the papers. Introducing [facility] SERVICES! Dear Marah Vail: Thank you for requesting a [employer] account. Our records indicate that you already have an active [employer] account. You can access your account anytime at [email or web address] Did you know that you can access your hospital and ER discharge instructions at any time in [employer]? You can also review all of your test results from your hospital stay or ER visit. Additional Information If you have questions, please visit the Frequently Asked Questions section of the [employer] website at [email or web address] Remember, [employer] is NOT to be used for urgent needs. For medical emergencies, dial 911. Now available from your iPhone and Android! Please provide this summary of care documentation to your next provider. Your primary care clinician is listed as Willy Babin64 If you have any questions after today's visit, please call [phone].

## 2018-06-18 NOTE — PROGRESS NOTES
Subjective:   45 y.o. female for Well Woman Check. Her gyne and breast care is done elsewhere by her Ob-Gyne physician. Patient Active Problem List    Diagnosis Date Noted    ACP (advance care planning) 06/06/2016    Allergic rhinitis 01/13/2014     Current Outpatient Prescriptions   Medication Sig Dispense Refill    varicella-zoster recombinant, PF, (SHINGRIX, PF,) 50 mcg/0.5 mL susr injection 0.5 mL by IntraMUSCular route once for 1 dose. 0.5 mL 1    methIMAzole (TAPAZOLE) 5 mg tablet Take 0.5 Tabs by mouth daily. (Patient taking differently: Take 2.5 mg by mouth every other day.) 30 Tab 2    triamcinolone acetonide (KENALOG) 0.1 % topical cream Apply  to affected area two (2) times daily as needed for Skin Irritation. use thin layer 45 g 1    hydrocortisone acetate 30 mg supp Insert 1 Dose into rectum two (2) times daily as needed. 12 Suppository 1    cholecalciferol (VITAMIN D3) 1,000 unit tablet Take  by mouth daily.  omega-3 fatty acids-vitamin e (FISH OIL) 1,000 mg cap Take 1 Cap by mouth two (2) times a week.  multivitamin (ONE A DAY) tablet Take 1 Tab by mouth daily.  Glucosamine &Chondroit-MV-Min3 398-994-32-0.5 mg tab Take  by mouth.       OTHER Natural estrogen supplement       Allergies   Allergen Reactions    Versed [Midazolam] Nausea and Vomiting     Past Medical History:   Diagnosis Date    Allergic rhinitis, cause unspecified      Past Surgical History:   Procedure Laterality Date    HX REFRACTIVE SURGERY      MA EMBOLIZATION UTERINE FIBROID      REPAIR ACHILLES TENDON,PRIMARY       Family History   Problem Relation Age of Onset    Elevated Lipids Father     Allergic Rhinitis Son      Social History   Substance Use Topics    Smoking status: Never Smoker    Smokeless tobacco: Never Used    Alcohol use No        Lab Results   Component Value Date/Time    WBC 6.1 04/02/2018 09:34 AM    HGB 13.4 04/02/2018 09:34 AM    HCT 39.3 04/02/2018 09:34 AM    PLATELET 441 04/02/2018 09:34 AM    MCV 92 04/02/2018 09:34 AM     Lab Results   Component Value Date/Time    Cholesterol, total 154 06/12/2017 12:00 AM    HDL Cholesterol 60 06/12/2017 12:00 AM    LDL, calculated 83 06/12/2017 12:00 AM    Triglyceride 57 06/12/2017 12:00 AM    CHOL/HDL Ratio 2.3 09/13/2010 12:00 PM     Lab Results   Component Value Date/Time    ALT (SGPT) 18 04/02/2018 09:34 AM    AST (SGOT) 22 04/02/2018 09:34 AM    Alk. phosphatase 71 04/02/2018 09:34 AM    Bilirubin, total 0.5 04/02/2018 09:34 AM    Albumin 4.5 04/02/2018 09:34 AM    Protein, total 7.8 04/02/2018 09:34 AM    PLATELET 137 63/01/9794 09:34 AM       Lab Results   Component Value Date/Time    GFR est non-AA 99 04/02/2018 09:34 AM    GFR est  04/02/2018 09:34 AM    Creatinine 0.70 04/02/2018 09:34 AM    BUN 13 04/02/2018 09:34 AM    Sodium 139 04/02/2018 09:34 AM    Potassium 4.6 04/02/2018 09:34 AM    Chloride 98 04/02/2018 09:34 AM    CO2 29 04/02/2018 09:34 AM     Lab Results   Component Value Date/Time    TSH 0.472 04/13/2018 10:58 AM    Triiodothyronine (T3), free 3.7 04/13/2018 10:58 AM    T4, Free 2.11 (H) 04/13/2018 10:58 AM      Lab Results   Component Value Date/Time    Glucose 91 04/02/2018 09:34 AM         Specific concerns today: Her anxiety symptoms are still present but improved. She does feel that the palpitation she had are improved. The treatment for her thyroid has significantly helped things. She does feel somewhat more fatigued. She is having some heat intolerance currently. .    Review of Systems  A comprehensive review of systems was negative except for that written in the HPI. Objective:   Blood pressure 110/78, pulse 79, temperature 98.7 °F (37.1 °C), temperature source Oral, resp. rate 16, height 5' 2.75\" (1.594 m), weight 110 lb 9.6 oz (50.2 kg), last menstrual period 02/13/2012, SpO2 98 %.    Physical Examination:   General appearance - alert, well appearing, and in no distress  Eyes - pupils equal and reactive, extraocular eye movements intact  Ears - bilateral TM's and external ear canals normal  Nose - normal and patent, no erythema, discharge or polyps  Mouth - mucous membranes moist, pharynx normal without lesions  Neck - supple, no significant adenopathy  Lymphatics - no palpable lymphadenopathy, no hepatosplenomegaly  Chest - clear to auscultation, no wheezes, rales or rhonchi, symmetric air entry  Heart - normal rate, regular rhythm, normal S1, S2, no murmurs, rubs, clicks or gallops  Abdomen - soft, nontender, nondistended, no masses or organomegaly  Back exam - full range of motion, no tenderness, palpable spasm or pain on motion  Neurological - alert, oriented, normal speech, no focal findings or movement disorder noted  Musculoskeletal - no joint tenderness, deformity or swelling  Extremities - peripheral pulses normal, no pedal edema, no clubbing or cyanosis     Assessment/Plan:     increase physical activity, routine labs ordered  Diagnoses and all orders for this visit:    1. Routine general medical examination at a health care facility  -     LIPID PANEL  -     UA/M W/RFLX CULTURE, ROUTINE  -     TSH AND FREE T4    2. Encounter for immunization  -     varicella-zoster recombinant, PF, (SHINGRIX, PF,) 50 mcg/0.5 mL susr injection; 0.5 mL by IntraMUSCular route once for 1 dose. 3. Colon cancer screening  -     REFERRAL TO GASTROENTEROLOGY    4. Seasonal allergic rhinitis due to pollen -stable. 5. Hyperthyroidism -improved. Will check levels and forward to Dr. Bandar Nicole. She may need adjustment on her medication. 6.  Anxiety-better controlled with her thyroid better. We discussed continued use of as needed medications and she will use that for now. Follow-up Disposition: 3 months for thyroid. Advised her to call back or return to office if symptoms worsen/change/persist.  Discussed expected course/resolution/complications of diagnosis in detail with patient.     Medication risks/benefits/costs/interactions/alternatives discussed with patient. She was given an after visit summary which includes diagnoses, current medications, & vitals. She expressed understanding with the diagnosis and plan.

## 2018-06-18 NOTE — PATIENT INSTRUCTIONS
As discussed in your appointment today, [address] is an important part of planning for your healthcare future. Discussing your preferences with your family and your care team is a part of good healthcare so that we can be guided by your known values and goals. Our office offers this service for you at your convenience. Our Nurse Navigators and certified Respecting Choices ® Facilitators, Jennifer Urbina and Carmen Lehman typically schedule family appointments for this service on Wednesdays. To schedule an Advance Care Planning visit or to receive more information about this service, please call [facility] Internal Medicine at [phone] and ask to speak directly to Stephie East or [employer]. Advance Care Planning: Care Instructions  Your Care Instructions  It can be hard to live with an illness that cannot be cured. But if your health is getting worse, you may want to make decisions about end-of-life care. Planning for the end of your life does not mean that you are giving up. It is a way to make sure that your wishes are met. Clearly stating your wishes can make it easier for your loved ones. Making plans while you are still able may also ease your mind and make your final days less stressful and more meaningful. Follow-up care is a key part of your treatment and safety. Be sure to make and go to all appointments, and call your doctor if you are having problems. It's also a good idea to know your test results and keep a list of the medicines you take. What can you do to plan for the end of life? · You can bring these issues up with your doctor. You do not need to wait until your doctor starts the conversation. You might start with \"I would not be willing to live with . Nasrin Slade \" When you complete this sentence it helps your doctor understand your wishes. · Talk openly and honestly with your doctor. This is the best way to understand the decisions you will need to make as your health changes.  Know that you can always change your mind. · Ask your doctor about commonly used life-support measures. These include tube feedings, breathing machines, and fluids given through a vein (IV). Understanding these treatments will help you decide whether you want them. · You may choose to have these life-supporting treatments for a limited time. This allows a trial period to see whether they will help you. You may also decide that you want your doctor to take only certain measures to keep you alive. It is important to spell out these conditions so that your doctor and family understand them. · Talk to your doctor about how long you are likely to live. He or she may be able to give you an idea of what usually happens with your specific illness. · Think about preparing papers that state your wishes. This way there will not be any confusion about what you want. You can change your instructions at any time. Which papers should you prepare? Advance directives are legal papers that tell doctors how you want to be cared for at the end of your life. You do not need a  to write these papers. Ask your doctor or your state health department for information on how to write your advance directives. They may have the forms for each of these types of papers. Make sure your doctor has a copy of these on file, and give a copy to a family member or close friend. · Consider a do-not-resuscitate order (DNR). This order asks that no extra treatments be done if your heart stops or you stop breathing. Extra treatments may include electrical shock to restart your heart or a machine to breathe for you. If you decide to have a DNR order, ask your doctor to explain and write it. Place the order in your home where everyone can easily see it. · Consider a living will. A living will explains your wishes in case you are in a coma or cannot communicate. Living cain tell doctors to use or not use treatments that would keep you alive.  You must have one or two witnesses or a notary present when you sign this form. · Consider a durable power of . This allows you to name a person to make decisions about your care if you are not able to. Most people ask a close friend or family member. Talk to this person about the kinds of treatments you want and those that you do not want. Make sure this person understands your wishes. If this person is not the health care agent named in your advance directive, also talk with your health care agent. These legal papers are simple to change. Tell your doctor what you want to change, and ask him or her to make a note in your medical file. Give your family updated copies of the papers.

## 2018-06-20 LAB
APPEARANCE UR: CLEAR
BACTERIA #/AREA URNS HPF: NORMAL /[HPF]
BACTERIA UR CULT: NORMAL
BILIRUB UR QL STRIP: NEGATIVE
CASTS URNS QL MICRO: NORMAL /LPF
CHOLEST SERPL-MCNC: 147 MG/DL (ref 100–199)
COLOR UR: YELLOW
EPI CELLS #/AREA URNS HPF: NORMAL /HPF
GLUCOSE UR QL: NEGATIVE
HDLC SERPL-MCNC: 59 MG/DL
HGB UR QL STRIP: NEGATIVE
KETONES UR QL STRIP: NEGATIVE
LDLC SERPL CALC-MCNC: 76 MG/DL (ref 0–99)
LEUKOCYTE ESTERASE UR QL STRIP: ABNORMAL
MICRO URNS: ABNORMAL
MUCOUS THREADS URNS QL MICRO: PRESENT
NITRITE UR QL STRIP: NEGATIVE
PH UR STRIP: 6.5 [PH] (ref 5–7.5)
PROT UR QL STRIP: NEGATIVE
RBC #/AREA URNS HPF: NORMAL /HPF
SP GR UR: 1.01 (ref 1–1.03)
T4 FREE SERPL-MCNC: 1.69 NG/DL (ref 0.82–1.77)
TRIGL SERPL-MCNC: 60 MG/DL (ref 0–149)
TSH SERPL DL<=0.005 MIU/L-ACNC: 0.46 UIU/ML (ref 0.45–4.5)
URINALYSIS REFLEX, 377202: ABNORMAL
UROBILINOGEN UR STRIP-MCNC: 0.2 MG/DL (ref 0.2–1)
VLDLC SERPL CALC-MCNC: 12 MG/DL (ref 5–40)
WBC #/AREA URNS HPF: NORMAL /HPF

## 2018-09-28 DIAGNOSIS — E05.90 HYPERTHYROIDISM: Primary | ICD-10-CM

## 2018-10-23 LAB
T4 FREE SERPL-MCNC: 1.6 NG/DL (ref 0.82–1.77)
TSH SERPL DL<=0.005 MIU/L-ACNC: 0.72 UIU/ML (ref 0.45–4.5)

## 2019-02-12 ENCOUNTER — TELEPHONE (OUTPATIENT)
Dept: INTERNAL MEDICINE CLINIC | Age: 54
End: 2019-02-12

## 2019-02-12 DIAGNOSIS — E05.90 HYPERTHYROIDISM: Primary | ICD-10-CM

## 2019-02-12 NOTE — TELEPHONE ENCOUNTER
Jacqueline Zamora (Self) [phone] (M)     Pt says that dr Abhay Greene told her that if she ever wanted to have her thyroid checked to call and request a lab sheet? She wants to come in.

## 2019-02-15 LAB
T4 FREE SERPL-MCNC: 1.68 NG/DL (ref 0.82–1.77)
TSH SERPL DL<=0.005 MIU/L-ACNC: 0.78 UIU/ML (ref 0.45–4.5)

## 2019-02-21 ENCOUNTER — CLINICAL SUPPORT (OUTPATIENT)
Dept: CARDIOLOGY CLINIC | Age: 54
End: 2019-02-21

## 2019-02-21 ENCOUNTER — OFFICE VISIT (OUTPATIENT)
Dept: INTERNAL MEDICINE CLINIC | Age: 54
End: 2019-02-21

## 2019-02-21 VITALS
DIASTOLIC BLOOD PRESSURE: 66 MMHG | BODY MASS INDEX: 19.67 KG/M2 | WEIGHT: 111 LBS | HEIGHT: 63 IN | OXYGEN SATURATION: 98 % | TEMPERATURE: 97.7 F | HEART RATE: 81 BPM | SYSTOLIC BLOOD PRESSURE: 104 MMHG | RESPIRATION RATE: 18 BRPM

## 2019-02-21 DIAGNOSIS — R00.2 PALPITATIONS: Primary | ICD-10-CM

## 2019-02-21 DIAGNOSIS — F41.9 ANXIETY: ICD-10-CM

## 2019-02-21 DIAGNOSIS — E05.90 HYPERTHYROIDISM: ICD-10-CM

## 2019-02-21 DIAGNOSIS — R00.2 PALPITATIONS: ICD-10-CM

## 2019-02-21 RX ORDER — LORAZEPAM 0.5 MG/1
0.25 TABLET ORAL AS NEEDED
COMMUNITY

## 2019-02-21 NOTE — PROGRESS NOTES
HPI:  Kirk Presley is a 52y.o. year old female who is here for an acute visit as a walk-in with complaints of palpitations. She says is been present for more than a week and seems to occur mostly with exertion. She seems to notice it when she her sit ups. She has had some difficulty with getting her heart rates to be lower. She thinks that they should be in the 70s but have been in the 90s. She denies any exertional chest pain. No shortness of breath. No cough or wheeze. She does feel that this makes her anxious and nervous when she feels it. She is not been taking any of her lorazepam.      Past Medical History:   Diagnosis Date    Allergic rhinitis, cause unspecified        Past Surgical History:   Procedure Laterality Date    HX REFRACTIVE SURGERY      [place] EMBOLIZATION UTERINE FIBROID      REPAIR ACHILLES [place]         Prior to Admission medications    Medication Sig Start Date End Date Taking? Authorizing Provider   TURMERIC PO Take  by mouth. Yes Provider, Historical   LORazepam (ATIVAN) 0.5 mg tablet Take 0.25 mg by mouth as needed for Anxiety. Yes Provider, Historical   cholecalciferol (VITAMIN D3) 1,000 unit tablet Take  by mouth daily. Yes Provider, Historical   omega-3 fatty acids-vitamin e (FISH OIL) 1,000 mg cap Take 1 Cap by mouth every other day. Yes Provider, Historical   multivitamin (ONE A DAY) tablet Take 1 Tab by mouth daily. Yes Provider, Historical   Glucosamine &Chondroit-MV-Min3 422-507-46-0.5 mg tab Take  by mouth.    Yes Provider, Historical       Social History     Socioeconomic History    Marital status:      Spouse name: Not on file    Number of children: Not on file    Years of education: Not on file    Highest education level: Not on file   Social Needs    Financial resource strain: Not on file    Food insecurity - worry: Not on file    Food insecurity - inability: Not on file    Transportation needs - medical: Not on file   [place] Transportation needs - non-medical: Not on file   Occupational History    Not on file   Tobacco Use    Smoking status: Never Smoker    Smokeless tobacco: Never Used   Substance and Sexual Activity    Alcohol use: No    Drug use: No    Sexual activity: Yes     Partners: Male   Other Topics Concern    Not on file   Social History Narrative    Not on file          ROS  Per HPI    Visit Vitals  /66   Pulse 81   Temp 97.7 °F (36.5 °C) (Oral)   Resp 18   Ht 5' 2.75\" (1.594 m)   Wt 111 lb (50.3 kg)   LMP 02/13/2012   SpO2 98%   BMI 19.82 kg/m²         Physical Exam   Physical Examination: General appearance - alert, well appearing, and in no distress  Chest - clear to auscultation, no wheezes, rales or rhonchi, symmetric air entry  Heart - normal rate, regular rhythm, normal S1, S2, no murmurs, rubs, clicks or gallops  Abdomen - soft, nontender, nondistended, no masses or organomegaly  Neurological - alert, oriented, normal speech, no focal findings or movement disorder noted  Extremities - peripheral pulses normal, no pedal edema, no clubbing or cyanosis     Results for orders placed or performed in visit on 02/12/19   TSH AND FREE T4   Result Value Ref Range    TSH 0.782 0.450 - 4.500 uIU/mL    T4, Free 1.68 0.82 - 1.77 ng/dL         Assessment/Plan:  Diagnoses and all orders for this visit:    1. Palpitations -likely PACs. Will check an EKG today and Holter monitor. Treat as needed. -     AMB POC EKG ROUTINE W/ 12 LEADS, INTER & REP  -     CARDIAC HOLTER MONITOR; Future    2. Hyperthyroidism-recent labs are normal.  Will repeat when she returns for a physical.    3. Anxiety-? Controlled. She will use her lorazepam as needed for now and let me know if not helping. Follow-up Disposition:  Return if symptoms worsen or fail to improve.        Advised her to call back or return to office if symptoms worsen/change/persist.  Discussed expected course/resolution/complications of diagnosis in detail with patient. Medication risks/benefits/costs/interactions/alternatives discussed with patient. She was given an after visit summary which includes diagnoses, current medications, & vitals. She expressed understanding with the diagnosis and plan.

## 2019-02-21 NOTE — PROGRESS NOTES
Chief Complaint   Patient presents with    Palpitations     pt states she is having increased episodes of palpiations when she is walking - especially in cold weather she notices it more - also notices when she stands to fast, denies pain, denies SOB, has hx of anxiety but states this is \"totally different\". Pt goes out of town today @ [address] to ? [place]? where she states it is colder so she is afraid of increasing episodes and something happening while there.

## 2019-03-01 ENCOUNTER — DOCUMENTATION ONLY (OUTPATIENT)
Dept: CARDIOLOGY CLINIC | Age: 54
End: 2019-03-01

## 2019-06-03 ENCOUNTER — OFFICE VISIT (OUTPATIENT)
Dept: INTERNAL MEDICINE CLINIC | Age: 54
End: 2019-06-03

## 2019-06-03 VITALS
HEART RATE: 73 BPM | BODY MASS INDEX: 19.49 KG/M2 | OXYGEN SATURATION: 98 % | WEIGHT: 110 LBS | DIASTOLIC BLOOD PRESSURE: 64 MMHG | TEMPERATURE: 97.3 F | SYSTOLIC BLOOD PRESSURE: 102 MMHG | RESPIRATION RATE: 12 BRPM | HEIGHT: 63 IN

## 2019-06-03 DIAGNOSIS — Z00.00 ROUTINE GENERAL MEDICAL EXAMINATION AT A HEALTH CARE FACILITY: Primary | ICD-10-CM

## 2019-06-03 DIAGNOSIS — J30.1 SEASONAL ALLERGIC RHINITIS DUE TO POLLEN: ICD-10-CM

## 2019-06-03 DIAGNOSIS — Z12.11 COLON CANCER SCREENING: ICD-10-CM

## 2019-06-03 DIAGNOSIS — R04.0 NOSEBLEED: ICD-10-CM

## 2019-06-03 DIAGNOSIS — E05.90 HYPERTHYROIDISM: ICD-10-CM

## 2019-06-03 DIAGNOSIS — R00.2 PALPITATIONS: ICD-10-CM

## 2019-06-03 RX ORDER — CETIRIZINE HCL 10 MG
10 TABLET ORAL AS NEEDED
COMMUNITY
Start: 2019-06-03

## 2019-06-03 RX ORDER — METHIMAZOLE 5 MG/1
2.5 TABLET ORAL EVERY OTHER DAY
COMMUNITY
End: 2019-07-16 | Stop reason: SDUPTHER

## 2019-06-03 NOTE — PROGRESS NOTES
Subjective:   52 y.o. female for Well Woman Check. Her gyne and breast care is done elsewhere by her Ob-Gyne physician. Patient Active Problem List    Diagnosis Date Noted    Hyperthyroidism 09/28/2018    ACP (advance care planning) 06/06/2016    Allergic rhinitis 01/13/2014     Current Outpatient Medications   Medication Sig Dispense Refill    varicella-zoster recombinant, PF, (SHINGRIX, PF,) 50 mcg/0.5 mL susr injection 0.5 mL by IntraMUSCular route once for 1 dose. 0.5 mL 1    methIMAzole (TAPAZOLE) 5 mg tablet Take 2.5 mg by mouth every other day.  WILD CHERRY by Does Not Apply route.  cetirizine (ZYRTEC) 10 mg tablet Take 1 Tab by mouth nightly.  TURMERIC PO Take  by mouth as needed.  LORazepam (ATIVAN) 0.5 mg tablet Take 0.25 mg by mouth as needed for Anxiety.  cholecalciferol (VITAMIN D3) 1,000 unit tablet Take  by mouth daily.  omega-3 fatty acids-vitamin e (FISH OIL) 1,000 mg cap Take 1 Cap by mouth BID SAT&SUN.  multivitamin (ONE A DAY) tablet Take 1 Tab by mouth daily.  Glucosamine &Chondroit-MV-Min3 182-374-94-0.5 mg tab Take  by mouth.        Allergies   Allergen Reactions    Versed [Midazolam] Nausea and Vomiting     Past Medical History:   Diagnosis Date    Allergic rhinitis, cause unspecified      Past Surgical History:   Procedure Laterality Date    HX REFRACTIVE SURGERY      [place] EMBOLIZATION UTERINE FIBROID      REPAIR ACHILLES TENDON,PRIMARY       Family History   Problem Relation Age of Onset    Elevated Lipids Father     Allergic Rhinitis Son      Social History     Tobacco Use    Smoking status: Never Smoker    Smokeless tobacco: Never Used   Substance Use Topics    Alcohol use: No        Lab Results   Component Value Date/Time    WBC 6.1 04/02/2018 09:34 AM    HGB 13.4 04/02/2018 09:34 AM    HCT 39.3 04/02/2018 09:34 AM    PLATELET 901 07/26/5140 09:34 AM    MCV 92 04/02/2018 09:34 AM     Lab Results   Component Value Date/Time Cholesterol, total 147 06/18/2018 10:39 AM    HDL Cholesterol 59 06/18/2018 10:39 AM    LDL, calculated 76 06/18/2018 10:39 AM    Triglyceride 60 06/18/2018 10:39 AM    CHOL/HDL Ratio 2.3 09/13/2010 12:00 PM     Lab Results   Component Value Date/Time    ALT (SGPT) 18 04/02/2018 09:34 AM    AST (SGOT) 22 04/02/2018 09:34 AM    Alk. phosphatase 71 04/02/2018 09:34 AM    Bilirubin, total 0.5 04/02/2018 09:34 AM    Albumin 4.5 04/02/2018 09:34 AM    Protein, total 7.8 04/02/2018 09:34 AM    PLATELET 778 98/27/8883 09:34 AM     Lab Results   Component Value Date/Time    GFR est non-AA 99 04/02/2018 09:34 AM    GFR est  04/02/2018 09:34 AM    Creatinine 0.70 04/02/2018 09:34 AM    BUN 13 04/02/2018 09:34 AM    Sodium 139 04/02/2018 09:34 AM    Potassium 4.6 04/02/2018 09:34 AM    Chloride 98 04/02/2018 09:34 AM    CO2 29 04/02/2018 09:34 AM     Lab Results   Component Value Date/Time    TSH 0.782 02/14/2019 12:04 PM    Triiodothyronine (T3), free 3.7 04/13/2018 10:58 AM    T4, Free 1.68 02/14/2019 12:04 PM      Lab Results   Component Value Date/Time    Glucose 91 04/02/2018 09:34 AM         Specific concerns today: Allergies ongoing. Some clear nasal discharge. No palpitations. Some anxiety off and on. Review of Systems  A comprehensive review of systems was negative except for that written in the HPI. Objective:   Blood pressure 102/64, pulse 73, temperature 97.3 °F (36.3 °C), temperature source Oral, resp. rate 12, height 5' 2.75\" (1.594 m), weight 110 lb (49.9 kg), last menstrual period 02/13/2012, SpO2 98 %.    Physical Examination:   General appearance - alert, well appearing, and in no distress  Eyes - pupils equal and reactive, extraocular eye movements intact  Ears - bilateral TM's and external ear canals normal  Nose - normal and patent, no erythema, discharge or polyps  Mouth - mucous membranes moist, pharynx normal without lesions  Neck - supple, no significant adenopathy  Lymphatics - no palpable lymphadenopathy, no hepatosplenomegaly  Chest - clear to auscultation, no wheezes, rales or rhonchi, symmetric air entry  Heart - normal rate, regular rhythm, normal S1, S2, no murmurs, rubs, clicks or gallops  Abdomen - soft, nontender, nondistended, no masses or organomegaly  Back exam - full range of motion, no tenderness, palpable spasm or pain on motion  Neurological - alert, oriented, normal speech, no focal findings or movement disorder noted  Musculoskeletal - no joint tenderness, deformity or swelling  Extremities - peripheral pulses normal, no pedal edema, no clubbing or cyanosis     Assessment/Plan:     continue present plan, routine labs ordered  Diagnoses and all orders for this visit:    1. Routine general medical examination at a health care facility  -     CBC WITH AUTOMATED DIFF  -     METABOLIC PANEL, COMPREHENSIVE  -     LIPID PANEL  -     TSH RFX ON ABNORMAL TO FREE T4    2. Seasonal allergic rhinitis due to pollen-we will use over-the-counter Zyrtec as needed. 3. Nosebleed-recurrent. She has seen ENT and defer to cautery. She will reconsider that. 4. Hyperthyroidism-appears euthyroid. Recheck levels. 5. Palpitations-resolved. 6. Colon cancer screening  -     REFERRAL TO GASTROENTEROLOGY    Other orders  -     varicella-zoster recombinant, PF, (SHINGRIX, PF,) 50 mcg/0.5 mL susr injection; 0.5 mL by IntraMUSCular route once for 1 dose. Advised her to call back or return to office if symptoms worsen/change/persist.  Discussed expected course/resolution/complications of diagnosis in detail with patient. Medication risks/benefits/costs/interactions/alternatives discussed with patient. She was given an after visit summary which includes diagnoses, current medications, & vitals. She expressed understanding with the diagnosis and plan.

## 2019-06-04 LAB
ALBUMIN SERPL-MCNC: 4.9 G/DL (ref 3.5–5.5)
ALBUMIN/GLOB SERPL: 1.4 {RATIO} (ref 1.2–2.2)
ALP SERPL-CCNC: 80 IU/L (ref 39–117)
ALT SERPL-CCNC: 18 IU/L (ref 0–32)
AST SERPL-CCNC: 29 IU/L (ref 0–40)
BASOPHILS # BLD AUTO: 0.1 X10E3/UL (ref 0–0.2)
BASOPHILS NFR BLD AUTO: 1 %
BILIRUB SERPL-MCNC: 0.4 MG/DL (ref 0–1.2)
BUN SERPL-MCNC: 12 MG/DL (ref 6–24)
BUN/CREAT SERPL: 15 (ref 9–23)
CALCIUM SERPL-MCNC: 9.7 MG/DL (ref 8.7–10.2)
CHLORIDE SERPL-SCNC: 102 MMOL/L (ref 96–106)
CHOLEST SERPL-MCNC: 180 MG/DL (ref 100–199)
CO2 SERPL-SCNC: 21 MMOL/L (ref 20–29)
CREAT SERPL-MCNC: 0.78 MG/DL (ref 0.57–1)
EOSINOPHIL # BLD AUTO: 0.1 X10E3/UL (ref 0–0.4)
EOSINOPHIL NFR BLD AUTO: 1 %
ERYTHROCYTE [DISTWIDTH] IN BLOOD BY AUTOMATED COUNT: 13 % (ref 12.3–15.4)
GLOBULIN SER CALC-MCNC: 3.4 G/DL (ref 1.5–4.5)
GLUCOSE SERPL-MCNC: 82 MG/DL (ref 65–99)
HCT VFR BLD AUTO: 43.4 % (ref 34–46.6)
HDLC SERPL-MCNC: 69 MG/DL
HGB BLD-MCNC: 14 G/DL (ref 11.1–15.9)
IMM GRANULOCYTES # BLD AUTO: 0 X10E3/UL (ref 0–0.1)
IMM GRANULOCYTES NFR BLD AUTO: 0 %
LDLC SERPL CALC-MCNC: 97 MG/DL (ref 0–99)
LYMPHOCYTES # BLD AUTO: 1.9 X10E3/UL (ref 0.7–3.1)
LYMPHOCYTES NFR BLD AUTO: 26 %
MCH RBC QN AUTO: 30.4 PG (ref 26.6–33)
MCHC RBC AUTO-ENTMCNC: 32.3 G/DL (ref 31.5–35.7)
MCV RBC AUTO: 94 FL (ref 79–97)
MONOCYTES # BLD AUTO: 0.4 X10E3/UL (ref 0.1–0.9)
MONOCYTES NFR BLD AUTO: 6 %
NEUTROPHILS # BLD AUTO: 4.7 X10E3/UL (ref 1.4–7)
NEUTROPHILS NFR BLD AUTO: 66 %
PLATELET # BLD AUTO: 308 X10E3/UL (ref 150–450)
POTASSIUM SERPL-SCNC: 4.8 MMOL/L (ref 3.5–5.2)
PROT SERPL-MCNC: 8.3 G/DL (ref 6–8.5)
RBC # BLD AUTO: 4.61 X10E6/UL (ref 3.77–5.28)
SODIUM SERPL-SCNC: 142 MMOL/L (ref 134–144)
TRIGL SERPL-MCNC: 69 MG/DL (ref 0–149)
TSH SERPL DL<=0.005 MIU/L-ACNC: 0.65 UIU/ML (ref 0.45–4.5)
VLDLC SERPL CALC-MCNC: 14 MG/DL (ref 5–40)
WBC # BLD AUTO: 7.2 X10E3/UL (ref 3.4–10.8)

## 2019-07-18 ENCOUNTER — TELEPHONE (OUTPATIENT)
Dept: ENDOCRINOLOGY | Age: 54
End: 2019-07-18

## 2019-10-07 RX ORDER — METHIMAZOLE 5 MG/1
TABLET ORAL
Qty: 15 TAB | Refills: 1 | Status: SHIPPED | OUTPATIENT
Start: 2019-10-07 | End: 2020-06-22 | Stop reason: SDUPTHER

## 2019-11-04 DIAGNOSIS — E05.90 HYPERTHYROIDISM: Primary | ICD-10-CM

## 2019-11-05 LAB
T4 FREE SERPL-MCNC: 1.45 NG/DL (ref 0.82–1.77)
TSH SERPL DL<=0.005 MIU/L-ACNC: 0.55 UIU/ML (ref 0.45–4.5)

## 2020-06-22 ENCOUNTER — OFFICE VISIT (OUTPATIENT)
Dept: INTERNAL MEDICINE CLINIC | Age: 55
End: 2020-06-22

## 2020-06-22 VITALS
DIASTOLIC BLOOD PRESSURE: 78 MMHG | HEART RATE: 89 BPM | BODY MASS INDEX: 20.2 KG/M2 | TEMPERATURE: 97.8 F | RESPIRATION RATE: 14 BRPM | HEIGHT: 63 IN | WEIGHT: 114 LBS | OXYGEN SATURATION: 96 % | SYSTOLIC BLOOD PRESSURE: 122 MMHG

## 2020-06-22 DIAGNOSIS — Z00.00 WELL WOMAN EXAM (NO GYNECOLOGICAL EXAM): ICD-10-CM

## 2020-06-22 DIAGNOSIS — R00.2 PALPITATIONS: ICD-10-CM

## 2020-06-22 DIAGNOSIS — E05.90 HYPERTHYROIDISM: ICD-10-CM

## 2020-06-22 DIAGNOSIS — Z00.00 ROUTINE GENERAL MEDICAL EXAMINATION AT A HEALTH CARE FACILITY: Primary | ICD-10-CM

## 2020-06-22 RX ORDER — INSULIN PUMP SYRINGE, 3 ML
EACH MISCELLANEOUS
Qty: 1 KIT | Refills: 0 | Status: SHIPPED | OUTPATIENT
Start: 2020-06-22

## 2020-06-22 RX ORDER — METHIMAZOLE 5 MG/1
TABLET ORAL
Qty: 15 TAB | Refills: 3 | Status: SHIPPED | OUTPATIENT
Start: 2020-06-22 | End: 2021-07-07

## 2020-06-22 NOTE — PROGRESS NOTES
Subjective:   62 y.o. female for Well Woman Check. Her gyne and breast care is done elsewhere by her Ob-Gyne physician. Patient Active Problem List    Diagnosis Date Noted    Hyperthyroidism 09/28/2018    ACP (advance care planning) 06/06/2016    Allergic rhinitis 01/13/2014     Current Outpatient Medications   Medication Sig Dispense Refill    methIMAzole (TAPAZOLE) 5 mg tablet Take 1/2 tab (2.5mg) once daily: 15 Tab 3    varicella-zoster recombinant, PF, (SHINGRIX) 50 mcg/0.5 mL susr injection 0.5 mL by IntraMUSCular route once for 1 dose. 0.5 mL 1    Blood-Glucose Meter monitoring kit As directed 1 Kit 0    WILD CHERRY by Does Not Apply route.  cetirizine (ZYRTEC) 10 mg tablet Take 10 mg by mouth as needed.  TURMERIC PO Take  by mouth as needed.  LORazepam (ATIVAN) 0.5 mg tablet Take 0.25 mg by mouth as needed for Anxiety.  cholecalciferol (VITAMIN D3) 1,000 unit tablet Take  by mouth daily.  omega-3 fatty acids-vitamin e (FISH OIL) 1,000 mg cap Take 1 Cap by mouth BID SAT&SUN.  multivitamin (ONE A DAY) tablet Take 1 Tab by mouth daily.  Glucosamine &Chondroit-MV-Min3 678-929-09-0.5 mg tab Take  by mouth.        Allergies   Allergen Reactions    Versed [Midazolam] Nausea and Vomiting     Past Medical History:   Diagnosis Date    Allergic rhinitis, cause unspecified      Past Surgical History:   Procedure Laterality Date    HX REFRACTIVE SURGERY      ME EMBOLIZATION UTERINE FIBROID      ME REPAIR ACHILLES TENDON,PRIMARY       Family History   Problem Relation Age of Onset    Elevated Lipids Father     Allergic Rhinitis Son      Social History     Tobacco Use    Smoking status: Never Smoker    Smokeless tobacco: Never Used   Substance Use Topics    Alcohol use: No        Lab Results   Component Value Date/Time    WBC 7.2 06/03/2019 02:22 PM    HGB 14.0 06/03/2019 02:22 PM    HCT 43.4 06/03/2019 02:22 PM    PLATELET 375 02/69/8362 02:22 PM    MCV 94 06/03/2019 02:22 PM     Lab Results   Component Value Date/Time    Cholesterol, total 180 06/03/2019 02:22 PM    HDL Cholesterol 69 06/03/2019 02:22 PM    LDL, calculated 97 06/03/2019 02:22 PM    Triglyceride 69 06/03/2019 02:22 PM    CHOL/HDL Ratio 2.3 09/13/2010 12:00 PM     Lab Results   Component Value Date/Time    ALT (SGPT) 18 06/03/2019 02:22 PM    Alk. phosphatase 80 06/03/2019 02:22 PM    Bilirubin, total 0.4 06/03/2019 02:22 PM    Albumin 4.9 06/03/2019 02:22 PM    Protein, total 8.3 06/03/2019 02:22 PM    PLATELET 645 00/61/8884 02:22 PM     Lab Results   Component Value Date/Time    GFR est non-AA 86 06/03/2019 02:22 PM    GFR est AA 99 06/03/2019 02:22 PM    Creatinine 0.78 06/03/2019 02:22 PM    BUN 12 06/03/2019 02:22 PM    Sodium 142 06/03/2019 02:22 PM    Potassium 4.8 06/03/2019 02:22 PM    Chloride 102 06/03/2019 02:22 PM    CO2 21 06/03/2019 02:22 PM     Lab Results   Component Value Date/Time    TSH 0.547 11/04/2019 03:21 PM    Triiodothyronine (T3), free 3.7 04/13/2018 10:58 AM    T4, Free 1.45 11/04/2019 03:21 PM      Lab Results   Component Value Date/Time    Glucose 82 06/03/2019 02:22 PM         Specific concerns today: Some intermittent jitteriness. .    Review of Systems  A comprehensive review of systems was negative except for that written in the HPI. Objective:   Blood pressure 122/78, pulse 89, temperature 97.8 °F (36.6 °C), temperature source Temporal, resp. rate 14, height 5' 2.75\" (1.594 m), weight 114 lb (51.7 kg), last menstrual period 02/13/2012, SpO2 96 %.    Physical Examination:   General appearance - alert, well appearing, and in no distress  Eyes - pupils equal and reactive, extraocular eye movements intact  Ears - bilateral TM's and external ear canals normal  Nose - normal and patent, no erythema, discharge or polyps  Mouth - mucous membranes moist, pharynx normal without lesions  Neck - supple, no significant adenopathy  Lymphatics - no palpable lymphadenopathy, no hepatosplenomegaly  Chest - clear to auscultation, no wheezes, rales or rhonchi, symmetric air entry  Heart - normal rate, regular rhythm, normal S1, S2, no murmurs, rubs, clicks or gallops  Abdomen - soft, nontender, nondistended, no masses or organomegaly  Back exam - full range of motion, no tenderness, palpable spasm or pain on motion  Neurological - alert, oriented, normal speech, no focal findings or movement disorder noted  Musculoskeletal - no joint tenderness, deformity or swelling  Extremities - peripheral pulses normal, no pedal edema, no clubbing or cyanosis     Assessment/Plan:     continue present plan, routine labs ordered  Diagnoses and all orders for this visit:    1. Routine general medical examination at a health care facility  -     methIMAzole (TAPAZOLE) 5 mg tablet; Take 1/2 tab (2.5mg) once daily:  -     METABOLIC PANEL, COMPREHENSIVE  -     TSH AND FREE T4  -     CBC WITH AUTOMATED DIFF  -     LIPID PANEL    2. Hyperthyroidism - check levels and adjust.     3. Palpitations - no recurrence. 4. Well woman exam (no gynecological exam)  Comments:  [V70.0]    Other orders  -     varicella-zoster recombinant, PF, (SHINGRIX) 50 mcg/0.5 mL susr injection; 0.5 mL by IntraMUSCular route once for 1 dose. -     Blood-Glucose Meter monitoring kit; As directed          Advised her to call back or return to office if symptoms worsen/change/persist.  Discussed expected course/resolution/complications of diagnosis in detail with patient. Medication risks/benefits/costs/interactions/alternatives discussed with patient. She was given an after visit summary which includes diagnoses, current medications, & vitals. She expressed understanding with the diagnosis and plan.

## 2020-06-23 LAB
ALBUMIN SERPL-MCNC: 4.9 G/DL (ref 3.8–4.9)
ALBUMIN/GLOB SERPL: 1.6 {RATIO} (ref 1.2–2.2)
ALP SERPL-CCNC: 81 IU/L (ref 39–117)
ALT SERPL-CCNC: 15 IU/L (ref 0–32)
AST SERPL-CCNC: 25 IU/L (ref 0–40)
BASOPHILS # BLD AUTO: 0.1 X10E3/UL (ref 0–0.2)
BASOPHILS NFR BLD AUTO: 1 %
BILIRUB SERPL-MCNC: 0.4 MG/DL (ref 0–1.2)
BUN SERPL-MCNC: 15 MG/DL (ref 6–24)
BUN/CREAT SERPL: 24 (ref 9–23)
CALCIUM SERPL-MCNC: 9.8 MG/DL (ref 8.7–10.2)
CHLORIDE SERPL-SCNC: 101 MMOL/L (ref 96–106)
CHOLEST SERPL-MCNC: 160 MG/DL (ref 100–199)
CO2 SERPL-SCNC: 24 MMOL/L (ref 20–29)
CREAT SERPL-MCNC: 0.63 MG/DL (ref 0.57–1)
EOSINOPHIL # BLD AUTO: 0 X10E3/UL (ref 0–0.4)
EOSINOPHIL NFR BLD AUTO: 1 %
ERYTHROCYTE [DISTWIDTH] IN BLOOD BY AUTOMATED COUNT: 11.8 % (ref 11.7–15.4)
GLOBULIN SER CALC-MCNC: 3 G/DL (ref 1.5–4.5)
GLUCOSE SERPL-MCNC: 79 MG/DL (ref 65–99)
HCT VFR BLD AUTO: 40.8 % (ref 34–46.6)
HDLC SERPL-MCNC: 57 MG/DL
HGB BLD-MCNC: 13.5 G/DL (ref 11.1–15.9)
IMM GRANULOCYTES # BLD AUTO: 0 X10E3/UL (ref 0–0.1)
IMM GRANULOCYTES NFR BLD AUTO: 0 %
LDLC SERPL CALC-MCNC: 84 MG/DL (ref 0–99)
LYMPHOCYTES # BLD AUTO: 2 X10E3/UL (ref 0.7–3.1)
LYMPHOCYTES NFR BLD AUTO: 29 %
MCH RBC QN AUTO: 30.1 PG (ref 26.6–33)
MCHC RBC AUTO-ENTMCNC: 33.1 G/DL (ref 31.5–35.7)
MCV RBC AUTO: 91 FL (ref 79–97)
MONOCYTES # BLD AUTO: 0.5 X10E3/UL (ref 0.1–0.9)
MONOCYTES NFR BLD AUTO: 7 %
NEUTROPHILS # BLD AUTO: 4.4 X10E3/UL (ref 1.4–7)
NEUTROPHILS NFR BLD AUTO: 62 %
PLATELET # BLD AUTO: 321 X10E3/UL (ref 150–450)
POTASSIUM SERPL-SCNC: 4.7 MMOL/L (ref 3.5–5.2)
PROT SERPL-MCNC: 7.9 G/DL (ref 6–8.5)
RBC # BLD AUTO: 4.48 X10E6/UL (ref 3.77–5.28)
SODIUM SERPL-SCNC: 140 MMOL/L (ref 134–144)
T4 FREE SERPL-MCNC: 1.54 NG/DL (ref 0.82–1.77)
TRIGL SERPL-MCNC: 93 MG/DL (ref 0–149)
TSH SERPL DL<=0.005 MIU/L-ACNC: 0.68 UIU/ML (ref 0.45–4.5)
VLDLC SERPL CALC-MCNC: 19 MG/DL (ref 5–40)
WBC # BLD AUTO: 7.1 X10E3/UL (ref 3.4–10.8)

## 2020-11-16 ENCOUNTER — PATIENT MESSAGE (OUTPATIENT)
Dept: INTERNAL MEDICINE CLINIC | Age: 55
End: 2020-11-16

## 2021-06-09 ENCOUNTER — OFFICE VISIT (OUTPATIENT)
Dept: INTERNAL MEDICINE CLINIC | Age: 56
End: 2021-06-09
Payer: COMMERCIAL

## 2021-06-09 VITALS
RESPIRATION RATE: 14 BRPM | SYSTOLIC BLOOD PRESSURE: 114 MMHG | BODY MASS INDEX: 20.55 KG/M2 | WEIGHT: 116 LBS | HEIGHT: 63 IN | OXYGEN SATURATION: 95 % | HEART RATE: 87 BPM | TEMPERATURE: 98.2 F | DIASTOLIC BLOOD PRESSURE: 76 MMHG

## 2021-06-09 DIAGNOSIS — Z00.00 WELL WOMAN EXAM (NO GYNECOLOGICAL EXAM): Primary | ICD-10-CM

## 2021-06-09 DIAGNOSIS — E05.90 HYPERTHYROIDISM: ICD-10-CM

## 2021-06-09 PROCEDURE — 99396 PREV VISIT EST AGE 40-64: CPT | Performed by: INTERNAL MEDICINE

## 2021-06-09 RX ORDER — ZOSTER VACCINE RECOMBINANT, ADJUVANTED 50 MCG/0.5
0.5 KIT INTRAMUSCULAR ONCE
Qty: 0.5 ML | Refills: 1 | Status: SHIPPED | OUTPATIENT
Start: 2021-06-09 | End: 2021-06-09

## 2021-06-09 NOTE — PROGRESS NOTES
Subjective:   60 y.o. female for Well Woman Check. Her gyne and breast care is done elsewhere by her Ob-Gyne physician. Patient Active Problem List    Diagnosis Date Noted    Hyperthyroidism 09/28/2018    ACP (advance care planning) 06/06/2016    Allergic rhinitis 01/13/2014     Current Outpatient Medications   Medication Sig Dispense Refill    psyllium (METAMUCIL) packet Take 1 Packet by mouth daily.  varicella-zoster recombinant, PF, (Shingrix, PF,) 50 mcg/0.5 mL susr injection 0.5 mL by IntraMUSCular route once for 1 dose. 0.5 mL 1    methIMAzole (TAPAZOLE) 5 mg tablet Take 1/2 tab (2.5mg) once daily: (Patient taking differently: Take 2.5 mg by mouth as needed.) 15 Tab 3    Blood-Glucose Meter monitoring kit As directed 1 Kit 0    WILD CHERRY by Does Not Apply route.  cetirizine (ZYRTEC) 10 mg tablet Take 10 mg by mouth as needed.  TURMERIC PO Take  by mouth as needed.  LORazepam (ATIVAN) 0.5 mg tablet Take 0.25 mg by mouth as needed for Anxiety.  cholecalciferol (VITAMIN D3) 1,000 unit tablet Take  by mouth daily.  omega-3 fatty acids-vitamin e (FISH OIL) 1,000 mg cap Take 1 Cap by mouth BID SAT&SUN.  multivitamin (ONE A DAY) tablet Take 1 Tab by mouth daily.  Glucosamine &Chondroit-MV-Min3 149-584-01-0.5 mg tab Take  by mouth.        Allergies   Allergen Reactions    Versed [Midazolam] Nausea and Vomiting     Past Medical History:   Diagnosis Date    Allergic rhinitis, cause unspecified      Past Surgical History:   Procedure Laterality Date    HX REFRACTIVE SURGERY      [place] EMBOLIZATION UTERINE FIBROID      IA REPAIR ACHILLES TENDON,PRIMARY       Family History   Problem Relation Age of Onset    Elevated Lipids Father     Allergic Rhinitis Son      Social History     Tobacco Use    Smoking status: Never Smoker    Smokeless tobacco: Never Used   Substance Use Topics    Alcohol use: No        Lab Results   Component Value Date/Time    WBC 7.1 06/22/2020 10:24 AM    HGB 13.5 06/22/2020 10:24 AM    HCT 40.8 06/22/2020 10:24 AM    PLATELET 701 11/88/3282 10:24 AM    MCV 91 06/22/2020 10:24 AM     Lab Results   Component Value Date/Time    Cholesterol, total 160 06/22/2020 10:24 AM    HDL Cholesterol 57 06/22/2020 10:24 AM    LDL, calculated 84 06/22/2020 10:24 AM    Triglyceride 93 06/22/2020 10:24 AM    CHOL/HDL Ratio 2.3 09/13/2010 12:00 PM     Lab Results   Component Value Date/Time    ALT (SGPT) 15 06/22/2020 10:24 AM    Alk. phosphatase 81 06/22/2020 10:24 AM    Bilirubin, total 0.4 06/22/2020 10:24 AM    Albumin 4.9 06/22/2020 10:24 AM    Protein, total 7.9 06/22/2020 10:24 AM    PLATELET 437 66/67/0954 10:24 AM     Lab Results   Component Value Date/Time    GFR est non- 06/22/2020 10:24 AM    GFR est  06/22/2020 10:24 AM    Creatinine 0.63 06/22/2020 10:24 AM    BUN 15 06/22/2020 10:24 AM    Sodium 140 06/22/2020 10:24 AM    Potassium 4.7 06/22/2020 10:24 AM    Chloride 101 06/22/2020 10:24 AM    CO2 24 06/22/2020 10:24 AM     Lab Results   Component Value Date/Time    TSH 0.683 06/22/2020 10:24 AM    Triiodothyronine (T3), free 3.7 04/13/2018 10:58 AM    T4, Free 1.54 06/22/2020 10:24 AM      Lab Results   Component Value Date/Time    Glucose 79 06/22/2020 10:24 AM         Specific concerns today: some DJD changes and aches that are not problematic. Anxiety well controlled. Rare palpitations. .    Review of Systems  A comprehensive review of systems was negative except for that written in the HPI. Objective:   Blood pressure 114/76, pulse 87, temperature 98.2 °F (36.8 °C), temperature source Temporal, resp. rate 14, height 5' 2.75\" (1.594 m), weight 116 lb (52.6 kg), last menstrual period 02/13/2012, SpO2 95 %.    Physical Examination:   General appearance - alert, well appearing, and in no distress  Ears - bilateral TM's and external ear canals normal  Nose - normal and patent, no erythema, discharge or polyps  Mouth - mucous membranes moist, pharynx normal without lesions  Neck - supple, no significant adenopathy  Lymphatics - no palpable lymphadenopathy, no hepatosplenomegaly  Chest - clear to auscultation, no wheezes, rales or rhonchi, symmetric air entry  Heart - normal rate, regular rhythm, normal S1, S2, no murmurs, rubs, clicks or gallops  Abdomen - soft, nontender, nondistended, no masses or organomegaly  Back exam - full range of motion, no tenderness, palpable spasm or pain on motion  Neurological - alert, oriented, normal speech, no focal findings or movement disorder noted  Musculoskeletal - no joint tenderness, deformity or swelling  Extremities - peripheral pulses normal, no pedal edema, no clubbing or cyanosis     Assessment/Plan:     continue present plan, routine labs ordered  Diagnoses and all orders for this visit:    1. Well woman exam (no gynecological exam)  Comments:  [V70.0]  Orders:  -     varicella-zoster recombinant, PF, (Shingrix, PF,) 50 mcg/0.5 mL susr injection; 0.5 mL by IntraMUSCular route once for 1 dose.  -     REFERRAL TO GASTROENTEROLOGY  -     METABOLIC PANEL, COMPREHENSIVE; Future  -     CBC WITH AUTOMATED DIFF; Future  -     LIPID PANEL; Future  -     TSH 3RD GENERATION; Future  -     T4, FREE; Future  -     URINALYSIS W/MICROSCOPIC; Future    2. Hyperthyroidism  -     METABOLIC PANEL, COMPREHENSIVE; Future  -     CBC WITH AUTOMATED DIFF; Future  -     LIPID PANEL; Future  -     TSH 3RD GENERATION; Future  -     T4, FREE; Future  -     URINALYSIS W/MICROSCOPIC;  Future

## 2021-06-10 LAB
ALB/GLOBRATIO, 58C: 1.3 (CALC) (ref 1–2.5)
ALBUMIN SERPL-MCNC: 4.5 G/DL (ref 3.6–5.1)
ALKALINE PHOSPHATASE, TOTAL, 25002000: 77 U/L (ref 37–153)
ALT SERPL-CCNC: 15 U/L (ref 6–29)
APPEARANCE UR: CLEAR
AST SERPL W P-5'-P-CCNC: 21 U/L (ref 10–35)
BACTERIA,BACTU: NORMAL /HPF
BASOPHILS # BLD: 61 CELLS/UL (ref 0–200)
BASOPHILS NFR BLD: 1 %
BILIRUB SERPL-MCNC: 0.5 MG/DL (ref 0.2–1.2)
BILIRUB UR QL: NEGATIVE
BILIRUB UR QL: NEGATIVE
BUN SERPL-MCNC: 11 MG/DL (ref 7–25)
BUN/CREATININE RATIO,BUCR: NORMAL (CALC) (ref 6–22)
CALCIUM SERPL-MCNC: 9.7 MG/DL (ref 8.6–10.4)
CHLORIDE SERPL-SCNC: 103 MMOL/L (ref 98–110)
CHOL/HDL RATIO,CHHDX: 2.7 (CALC)
CHOLEST SERPL-MCNC: 168 MG/DL
CO2 SERPL-SCNC: 29 MMOL/L (ref 20–32)
COLOR UR: YELLOW
CREAT SERPL-MCNC: 0.6 MG/DL (ref 0.5–1.05)
EOSINOPHIL # BLD: 73 CELLS/UL (ref 15–500)
EOSINOPHIL NFR BLD: 1.2 %
ERYTHROCYTE [DISTWIDTH] IN BLOOD BY AUTOMATED COUNT: 12 % (ref 11–15)
GLOBULIN,GLOB: 3.6 G/DL (CALC) (ref 1.9–3.7)
GLUCOSE SERPL-MCNC: 85 MG/DL (ref 65–99)
HCT VFR BLD AUTO: 41.7 % (ref 35–45)
HDLC SERPL-MCNC: 63 MG/DL
HGB BLD-MCNC: 13.1 G/DL (ref 11.7–15.5)
HGB UR QL STRIP: NEGATIVE
HYALINE CAST,HYCST: NORMAL /LPF
KETONES UR QL STRIP.AUTO: NEGATIVE
LDL-CHOLESTEROL: 86 MG/DL (CALC)
LEUKOCYTE ESTERASE: NEGATIVE
LYMPHOCYTES # BLD: 1873 CELLS/UL (ref 850–3900)
LYMPHOCYTES NFR BLD: 30.7 %
MCH RBC QN AUTO: 30 PG (ref 27–33)
MCHC RBC AUTO-ENTMCNC: 31.4 G/DL (ref 32–36)
MCV RBC AUTO: 95.4 FL (ref 80–100)
MONOCYTES # BLD: 451 CELLS/UL (ref 200–950)
MONOCYTES NFR BLD: 7.4 %
NEUTROPHILS # BLD AUTO: 3642 CELLS/UL (ref 1500–7800)
NEUTROPHILS # BLD: 59.7 %
NITRITE UR QL STRIP.AUTO: NEGATIVE
NON-HDL CHOLESTEROL, 011976: 105 MG/DL (CALC)
PH UR STRIP: 6.5 [PH] (ref 5–8)
PLATELET # BLD AUTO: 292 THOUSAND/UL (ref 140–400)
PMV BLD AUTO: 9 FL (ref 7.5–12.5)
POTASSIUM SERPL-SCNC: 4.4 MMOL/L (ref 3.5–5.3)
PROT SERPL-MCNC: 8.1 G/DL (ref 6.1–8.1)
PROT UR STRIP-MCNC: NEGATIVE MG/DL
RBC # BLD AUTO: 4.37 MILLION/UL (ref 3.8–5.1)
RBC #/AREA URNS HPF: NORMAL /HPF (ref 0–2)
SODIUM SERPL-SCNC: 139 MMOL/L (ref 135–146)
SP GR UR REFRACTOMETRY: 1 (ref 1–1.03)
SQUAMOUS EPITHELIAL CELLS: NORMAL /HPF
T4 FREE SERPL-MCNC: 1.5 NG/DL (ref 0.8–1.8)
TRIGL SERPL-MCNC: 91 MG/DL (ref ?–150)
TSH SERPL DL<=0.005 MIU/L-ACNC: 0.71 MIU/L (ref 0.4–4.5)
WBC # BLD AUTO: 6.1 THOUSAND/UL (ref 3.8–10.8)
WBC URNS QL MICRO: NORMAL /HPF (ref 0–5)

## 2021-07-07 DIAGNOSIS — Z00.00 ROUTINE GENERAL MEDICAL EXAMINATION AT A HEALTH CARE FACILITY: ICD-10-CM

## 2021-07-07 RX ORDER — METHIMAZOLE 5 MG/1
TABLET ORAL
Qty: 15 TABLET | Refills: 3 | Status: SHIPPED | OUTPATIENT
Start: 2021-07-07 | End: 2022-07-11

## 2021-07-14 ENCOUNTER — PATIENT MESSAGE (OUTPATIENT)
Dept: INTERNAL MEDICINE CLINIC | Age: 56
End: 2021-07-14

## 2021-07-14 RX ORDER — HYDROCORTISONE 25 MG/G
CREAM TOPICAL 4 TIMES DAILY
Qty: 30 G | Refills: 1 | Status: SHIPPED | OUTPATIENT
Start: 2021-07-14 | End: 2021-12-20 | Stop reason: SDUPTHER

## 2021-08-12 ENCOUNTER — PATIENT MESSAGE (OUTPATIENT)
Dept: INTERNAL MEDICINE CLINIC | Age: 56
End: 2021-08-12

## 2021-12-20 RX ORDER — HYDROCORTISONE 25 MG/G
CREAM TOPICAL 4 TIMES DAILY
Qty: 30 G | Refills: 1 | Status: SHIPPED | OUTPATIENT
Start: 2021-12-20

## 2022-03-19 PROBLEM — E05.90 HYPERTHYROIDISM: Status: ACTIVE | Noted: 2018-09-28

## 2022-06-13 ENCOUNTER — OFFICE VISIT (OUTPATIENT)
Dept: INTERNAL MEDICINE CLINIC | Age: 57
End: 2022-06-13
Payer: COMMERCIAL

## 2022-06-13 VITALS
OXYGEN SATURATION: 97 % | RESPIRATION RATE: 16 BRPM | HEART RATE: 77 BPM | WEIGHT: 114 LBS | TEMPERATURE: 98.2 F | HEIGHT: 63 IN | SYSTOLIC BLOOD PRESSURE: 100 MMHG | BODY MASS INDEX: 20.2 KG/M2 | DIASTOLIC BLOOD PRESSURE: 65 MMHG

## 2022-06-13 DIAGNOSIS — J30.1 SEASONAL ALLERGIC RHINITIS DUE TO POLLEN: ICD-10-CM

## 2022-06-13 DIAGNOSIS — R00.2 PALPITATIONS: ICD-10-CM

## 2022-06-13 DIAGNOSIS — Z12.11 COLON CANCER SCREENING: ICD-10-CM

## 2022-06-13 DIAGNOSIS — E05.90 HYPERTHYROIDISM: ICD-10-CM

## 2022-06-13 DIAGNOSIS — Z00.00 WELL WOMAN EXAM (NO GYNECOLOGICAL EXAM): ICD-10-CM

## 2022-06-13 DIAGNOSIS — Z00.00 ROUTINE GENERAL MEDICAL EXAMINATION AT A HEALTH CARE FACILITY: Primary | ICD-10-CM

## 2022-06-13 LAB
ALBUMIN SERPL-MCNC: 4 G/DL (ref 3.5–5)
ALBUMIN/GLOB SERPL: 1 {RATIO} (ref 1.1–2.2)
ALP SERPL-CCNC: 80 U/L (ref 45–117)
ALT SERPL-CCNC: 22 U/L (ref 12–78)
ANION GAP SERPL CALC-SCNC: 5 MMOL/L (ref 5–15)
AST SERPL-CCNC: 21 U/L (ref 15–37)
BASOPHILS # BLD: 0.1 K/UL (ref 0–0.1)
BASOPHILS NFR BLD: 1 % (ref 0–1)
BILIRUB SERPL-MCNC: 0.6 MG/DL (ref 0.2–1)
BUN SERPL-MCNC: 12 MG/DL (ref 6–20)
BUN/CREAT SERPL: 18 (ref 12–20)
CALCIUM SERPL-MCNC: 9.3 MG/DL (ref 8.5–10.1)
CHLORIDE SERPL-SCNC: 104 MMOL/L (ref 97–108)
CHOLEST SERPL-MCNC: 161 MG/DL
CO2 SERPL-SCNC: 31 MMOL/L (ref 21–32)
CREAT SERPL-MCNC: 0.66 MG/DL (ref 0.55–1.02)
DIFFERENTIAL METHOD BLD: NORMAL
EOSINOPHIL # BLD: 0.1 K/UL (ref 0–0.4)
EOSINOPHIL NFR BLD: 1 % (ref 0–7)
ERYTHROCYTE [DISTWIDTH] IN BLOOD BY AUTOMATED COUNT: 12.2 % (ref 11.5–14.5)
GLOBULIN SER CALC-MCNC: 4.2 G/DL (ref 2–4)
GLUCOSE SERPL-MCNC: 84 MG/DL (ref 65–100)
HCT VFR BLD AUTO: 41.1 % (ref 35–47)
HDLC SERPL-MCNC: 64 MG/DL
HDLC SERPL: 2.5 {RATIO} (ref 0–5)
HGB BLD-MCNC: 13.1 G/DL (ref 11.5–16)
IMM GRANULOCYTES # BLD AUTO: 0 K/UL (ref 0–0.04)
IMM GRANULOCYTES NFR BLD AUTO: 0 % (ref 0–0.5)
LDLC SERPL CALC-MCNC: 86.2 MG/DL (ref 0–100)
LYMPHOCYTES # BLD: 1.5 K/UL (ref 0.8–3.5)
LYMPHOCYTES NFR BLD: 29 % (ref 12–49)
MCH RBC QN AUTO: 30.3 PG (ref 26–34)
MCHC RBC AUTO-ENTMCNC: 31.9 G/DL (ref 30–36.5)
MCV RBC AUTO: 94.9 FL (ref 80–99)
MONOCYTES # BLD: 0.5 K/UL (ref 0–1)
MONOCYTES NFR BLD: 9 % (ref 5–13)
NEUTS SEG # BLD: 3.2 K/UL (ref 1.8–8)
NEUTS SEG NFR BLD: 60 % (ref 32–75)
NRBC # BLD: 0 K/UL (ref 0–0.01)
NRBC BLD-RTO: 0 PER 100 WBC
PLATELET # BLD AUTO: 337 K/UL (ref 150–400)
PMV BLD AUTO: 9.1 FL (ref 8.9–12.9)
POTASSIUM SERPL-SCNC: 4.2 MMOL/L (ref 3.5–5.1)
PROT SERPL-MCNC: 8.2 G/DL (ref 6.4–8.2)
RBC # BLD AUTO: 4.33 M/UL (ref 3.8–5.2)
SODIUM SERPL-SCNC: 140 MMOL/L (ref 136–145)
T4 FREE SERPL-MCNC: 1.4 NG/DL (ref 0.8–1.5)
TRIGL SERPL-MCNC: 54 MG/DL (ref ?–150)
TSH SERPL DL<=0.05 MIU/L-ACNC: 0.44 UIU/ML (ref 0.36–3.74)
VLDLC SERPL CALC-MCNC: 10.8 MG/DL
WBC # BLD AUTO: 5.3 K/UL (ref 3.6–11)

## 2022-06-13 PROCEDURE — 99396 PREV VISIT EST AGE 40-64: CPT | Performed by: INTERNAL MEDICINE

## 2022-06-13 NOTE — PATIENT INSTRUCTIONS
Patellofemoral Pain Syndrome (Runner's Knee): Exercises  Introduction  Here are some examples of exercises for you to try. The exercises may be suggested for a condition or for rehabilitation. Start each exercise slowly. Ease off the exercises if you start to have pain. You will be told when to start these exercises and which ones will work best for you. How to do the exercises  Calf wall stretch    1. Stand facing a wall with your hands on the wall at about eye level. Put your affected leg about a step behind your other leg. 2. Keeping your back leg straight and your back heel on the floor, bend your front knee and gently bring your hip and chest toward the wall until you feel a stretch in the calf of your back leg. 3. Hold the stretch for at least 15 to 30 seconds. 4. Repeat 2 to 4 times. 5. Repeat steps 1 through 4, but this time keep your back knee bent. Quadriceps stretch    1. If you are not steady on your feet, hold on to a chair, counter, or wall. 2. Bend your affected leg, and reach behind you to grab the front of your foot or ankle with the hand on the same side. For example, if you are stretching your right leg, use your right hand. 3. Keeping your knees next to each other, pull your foot toward your buttock until you feel a gentle stretch across the front of your hip and down the front of your thigh. Your knee should be pointed directly to the ground, and not out to the side. 4. Hold the stretch for at least 15 to 30 seconds. 5. Repeat 2 to 4 times. Hamstring wall stretch    1. Lie on your back in a doorway, with your good leg through the open door. 2. Slide your affected leg up the wall to straighten your knee. You should feel a gentle stretch down the back of your leg. 3. Hold the stretch for at least 1 minute. Then over time, try to lengthen the time you hold the stretch to as long as 6 minutes. 4. Repeat 2 to 4 times.   5. If you do not have a place to do this exercise in a doorway, there is another way to do it:  6. Lie on your back, and bend your affected leg. 7. Loop a towel under the ball and toes of that foot, and hold the ends of the towel in your hands. 8. Straighten your knee, and slowly pull back on the towel. You should feel a gentle stretch down the back of your leg. 9. Hold the stretch for at least 15 to 30 seconds. Or even better, hold the stretch for 1 minute if you can. 10. Repeat 2 to 4 times. 1. Do not arch your back. 2. Do not bend either knee. 3. Keep one heel touching the floor and the other heel touching the wall. Do not point your toes. Quad sets    1. Sit with your affected leg straight and supported on the floor or a firm bed. Place a small, rolled-up towel under your affected knee. Your other leg should be bent, with that foot flat on the floor. 2. Tighten the thigh muscles of your affected leg by pressing the back of your knee down into the towel. 3. Hold for about 6 seconds, then rest for up to 10 seconds. 4. Repeat 8 to 12 times. Straight-leg raises to the front    1. Lie on your back with your good knee bent so that your foot rests flat on the floor. Your affected leg should be straight. Make sure that your low back has a normal curve. You should be able to slip your hand in between the floor and the small of your back, with your palm touching the floor and your back touching the back of your hand. 2. Tighten the thigh muscles in your affected leg by pressing the back of your knee flat down to the floor. Hold your knee straight. 3. Keeping the thigh muscles tight and your leg straight, lift your affected leg up so that your heel is about 12 inches off the floor. 4. Hold for about 6 seconds, then lower your leg slowly. Rest for up to 10 seconds between repetitions. 5. Repeat 8 to 12 times. Straight-leg raises to the back    1. Lie on your stomach, and lift your leg straight up behind you (toward the ceiling).   2. Lift your toes about 6 inches off the floor, hold for about 6 seconds, then lower slowly. 3. Do 8 to 12 repetitions. Wall slide with ball squeeze    1. Stand with your back against a wall and with your feet about shoulder-width apart. Your feet should be about 12 inches away from the wall. 2. Put a ball about the size of a soccer ball between your knees. Then slowly slide down the wall until your knees are bent about 20 to 30 degrees. 3. Tighten your thigh muscles by squeezing the ball between your knees. Hold that position for about 10 seconds, then stop squeezing. Rest for up to 10 seconds between repetitions. 4. Repeat 8 to 12 times. Follow-up care is a key part of your treatment and safety. Be sure to make and go to all appointments, and call your doctor if you are having problems. It's also a good idea to know your test results and keep a list of the medicines you take. Where can you learn more? Go to http://www.moss.com/  Enter A404 in the search box to learn more about \"Patellofemoral Pain Syndrome (Runner's Knee): Exercises. \"  Current as of: July 1, 2021               Content Version: 13.2  © 0513-0760 [employer]. Care instructions adapted under license by [employer] (which disclaims liability or warranty for this information). If you have questions about a medical condition or this instruction, always ask your healthcare professional. [place] [address] any warranty or liability for your use of this information.

## 2022-06-13 NOTE — PROGRESS NOTES
Verified name and birth date for privacy precautions. Chart reviewed in preparation for today's visit.      Chief Complaint   Patient presents with    Complete Physical          Health Maintenance Due   Topic    Colorectal Cancer Screening Combo     Shingrix Vaccine Age 49> (1 of 2)    COVID-19 Vaccine (3 - Booster for Carey Peter series)    Depression Screen          Wt Readings from Last 3 Encounters:   06/13/22 114 lb (51.7 kg)   06/09/21 116 lb (52.6 kg)   06/22/20 114 lb (51.7 kg)     Temp Readings from Last 3 Encounters:   06/13/22 98.2 °F (36.8 °C) (Temporal)   06/09/21 98.2 °F (36.8 °C) (Temporal)   06/22/20 97.8 °F (36.6 °C) (Temporal)     BP Readings from Last 3 Encounters:   06/13/22 100/65   06/09/21 114/76   06/22/20 122/78     Pulse Readings from Last 3 Encounters:   06/13/22 77   06/09/21 87   06/22/20 89         Learning Assessment:  :     Learning Assessment 3/23/2015 1/13/2014   PRIMARY LEARNER Patient Patient   HIGHEST LEVEL OF EDUCATION - PRIMARY LEARNER  GRADUATED HIGH SCHOOL OR GED -   BARRIERS PRIMARY LEARNER NONE -   PRIMARY LANGUAGE ENGLISH ENGLISH   LEARNER PREFERENCE PRIMARY DEMONSTRATION DEMONSTRATION     READING -   ANSWERED BY patient patient   RELATIONSHIP SELF SELF       Depression Screening:  :     3 most recent PHQ Screens 6/13/2022   Little interest or pleasure in doing things Not at all   Feeling down, depressed, irritable, or hopeless Not at all   Total Score PHQ 2 0   Trouble falling or staying asleep, or sleeping too much -   Feeling tired or having little energy -   Poor appetite, weight loss, or overeating -   Feeling bad about yourself - or that you are a failure or have let yourself or your family down -   Trouble concentrating on things such as school, work, reading, or watching TV -   Moving or speaking so slowly that other people could have noticed; or the opposite being so fidgety that others notice -   Thoughts of being better off dead, or hurting yourself in some way -   PHQ 9 Score -   How difficult have these problems made it for you to do your work, take care of your home and get along with others -       Fall Risk Assessment:  :     No flowsheet data found. Abuse Screening:  :     Abuse Screening Questionnaire 6/13/2022   Do you ever feel afraid of your partner? N   Are you in a relationship with someone who physically or mentally threatens you? N   Is it safe for you to go home?  Jennifer Sommers

## 2022-06-13 NOTE — PROGRESS NOTES
Subjective:   60 y.o. female for Well Woman Check. Her gyne and breast care is done elsewhere by her Ob-Gyne physician. Patient Active Problem List    Diagnosis Date Noted    Hyperthyroidism 09/28/2018    ACP (advance care planning) 06/06/2016    Allergic rhinitis 01/13/2014     Current Outpatient Medications   Medication Sig Dispense Refill    hydrocortisone (ANUSOL-HC) 2.5 % rectal cream Insert  into rectum four (4) times daily. 30 g 1    methIMAzole (TAPAZOLE) 5 mg tablet TAKE 1/2 A TABLET BY MOUTH EVERY DAY 15 Tablet 3    psyllium (METAMUCIL) packet Take 1 Packet by mouth daily.  WILD CHERRY by Does Not Apply route. Twice a week      TURMERIC PO Take  by mouth. Twice a week      cholecalciferol (VITAMIN D3) 1,000 unit tablet Take  by mouth daily.  omega-3 fatty acids-vitamin e (FISH OIL) 1,000 mg cap Take 1 Cap by mouth BID SAT&SUN.  multivitamin (ONE A DAY) tablet Take 1 Tab by mouth daily.  Glucosamine &Chondroit-MV-Min3 853-665-88-0.5 mg tab Take  by mouth.  Blood-Glucose Meter monitoring kit As directed 1 Kit 0    cetirizine (ZYRTEC) 10 mg tablet Take 10 mg by mouth as needed. (Patient not taking: Reported on 6/13/2022)      LORazepam (ATIVAN) 0.5 mg tablet Take 0.25 mg by mouth as needed for Anxiety.  (Patient not taking: Reported on 6/13/2022)       Allergies   Allergen Reactions    Versed [Midazolam] Nausea and Vomiting     Past Medical History:   Diagnosis Date    Allergic rhinitis, cause unspecified      Past Surgical History:   Procedure Laterality Date    HX REFRACTIVE SURGERY      ID EMBOLIZATION UTERINE FIBROID      ID REPAIR ACHILLES TENDON,PRIMARY       Family History   Problem Relation Age of Onset    Elevated Lipids Father     Allergic Rhinitis Son      Social History     Tobacco Use    Smoking status: Never Smoker    Smokeless tobacco: Never Used   Substance Use Topics    Alcohol use: No        Lab Results   Component Value Date/Time    WBC 6.1 06/09/2021 10:58 AM    HGB 13.1 06/09/2021 10:58 AM    HCT 41.7 06/09/2021 10:58 AM    PLATELET 349 21/59/7851 10:58 AM    MCV 95.4 06/09/2021 10:58 AM     Lab Results   Component Value Date/Time    Cholesterol, total 168 06/09/2021 10:58 AM    HDL Cholesterol 63 06/09/2021 10:58 AM    LDL-CHOLESTEROL 86 06/09/2021 10:58 AM    LDL, calculated 84 06/22/2020 10:24 AM    Triglyceride 91 06/09/2021 10:58 AM    CHOL/HDL Ratio 2.3 09/13/2010 12:00 PM    Cholesterol/HDL ratio 2.7 06/09/2021 10:58 AM     Lab Results   Component Value Date/Time    ALT (SGPT) 15 06/09/2021 10:58 AM    Alk. phosphatase 81 06/22/2020 10:24 AM    Bilirubin, total 0.5 06/09/2021 10:58 AM    Albumin 4.5 06/09/2021 10:58 AM    Protein, total 8.1 06/09/2021 10:58 AM    PLATELET 875 60/06/9989 10:58 AM     Lab Results   Component Value Date/Time    GFR est non- 06/09/2021 10:58 AM    GFR est  06/09/2021 10:58 AM    Creatinine 0.60 06/09/2021 10:58 AM    BUN 11 06/09/2021 10:58 AM    Sodium 139 06/09/2021 10:58 AM    Potassium 4.4 06/09/2021 10:58 AM    Chloride 103 06/09/2021 10:58 AM    CO2 29 06/09/2021 10:58 AM     Lab Results   Component Value Date/Time    TSH 0.71 06/09/2021 10:58 AM    Triiodothyronine (T3), free 3.7 04/13/2018 10:58 AM    T4, Free 1.5 06/09/2021 10:58 AM      Lab Results   Component Value Date/Time    Glucose 85 06/09/2021 10:58 AM         Specific concerns today: Some intermittent episodes of palpitations. She will take her methimazole as needed and it does seem to help. She is exercising regularly. She is up-to-date on mammograms and Pap smears. .    Review of Systems  A comprehensive review of systems was negative except for that written in the HPI. Objective:   Blood pressure 100/65, pulse 77, temperature 98.2 °F (36.8 °C), temperature source Temporal, resp. rate 16, height 5' 3\" (1.6 m), weight 114 lb (51.7 kg), last menstrual period 02/13/2012, SpO2 97 %.    Physical Examination:   General appearance - alert, well appearing, and in no distress  Ears - bilateral TM's and external ear canals normal  Mouth - mucous membranes moist, pharynx normal without lesions  Neck - supple, no significant adenopathy  Lymphatics - no palpable lymphadenopathy, no hepatosplenomegaly  Chest - clear to auscultation, no wheezes, rales or rhonchi, symmetric air entry  Heart - normal rate, regular rhythm, normal S1, S2, no murmurs, rubs, clicks or gallops  Abdomen - soft, nontender, nondistended, no masses or organomegaly  Neurological - alert, oriented, normal speech, no focal findings or movement disorder noted  Musculoskeletal - no joint tenderness, deformity or swelling  Extremities - peripheral pulses normal, no pedal edema, no clubbing or cyanosis     Assessment/Plan:     continue present plan, routine labs ordered  Diagnoses and all orders for this visit:    1. Routine general medical examination at a health care facility  -     METABOLIC PANEL, COMPREHENSIVE; Future  -     CBC WITH AUTOMATED DIFF; Future  -     LIPID PANEL; Future  -     TSH 3RD GENERATION; Future  -     T4, FREE; Future    2. Colon cancer screening  -     REFERRAL TO COLON AND RECTAL SURGERY    3. Hyperthyroidism repeat lab work to be sure this is stable. -     TSH 3RD GENERATION; Future  -     T4, FREE; Future    4. Palpitations-infrequent. Will let me know if it returns. 5. Seasonal allergic rhinitis due to pollen    6.  Well woman exam (no gynecological exam)  Comments:  [V70.0]

## 2022-07-11 DIAGNOSIS — Z00.00 ROUTINE GENERAL MEDICAL EXAMINATION AT A HEALTH CARE FACILITY: ICD-10-CM

## 2022-07-11 RX ORDER — METHIMAZOLE 5 MG/1
TABLET ORAL
Qty: 15 TABLET | Refills: 3 | Status: SHIPPED | OUTPATIENT
Start: 2022-07-11

## 2022-10-11 ENCOUNTER — PATIENT MESSAGE (OUTPATIENT)
Dept: INTERNAL MEDICINE CLINIC | Age: 57
End: 2022-10-11

## 2022-10-11 NOTE — TELEPHONE ENCOUNTER
Returned call to patient. Her son suddenly committed suicide yesterday and with no warning. She is struggling with her loss and unable to sleep or eat. Took 1/2 ativan last PM and it did not help with sleep. She is trying to stay strong for her family. Will use 1 Ativan TID PRN and call if not better.

## 2022-10-30 ENCOUNTER — TELEPHONE (OUTPATIENT)
Dept: INTERNAL MEDICINE CLINIC | Age: 57
End: 2022-10-30

## 2022-10-30 NOTE — TELEPHONE ENCOUNTER
----- Message from Jeramie Garduno sent at 10/28/2022  3:34 PM EDT -----  Subject: Message to Provider    QUESTIONS  Information for Provider? John Guevara: has a couple questions to ask regarding   her condition.   ---------------------------------------------------------------------------  --------------  [id number]  [phone]; OK to leave message on voicemail  ---------------------------------------------------------------------------  --------------  SCRIPT ANSWERS  Relationship to Patient?  Self

## 2022-10-31 ENCOUNTER — TELEPHONE (OUTPATIENT)
Dept: INTERNAL MEDICINE CLINIC | Age: 57
End: 2022-10-31

## 2022-10-31 NOTE — TELEPHONE ENCOUNTER
Reason for call:  Spoke with pt. She was returning [facility] phone call.  Pt requested a call pack from [facility]    Is this a new problem: yes     Date of last appointment:  6/13/2022     Can we respond via [employer]: no    Best call back number: [phone] (

## 2022-11-04 ENCOUNTER — OFFICE VISIT (OUTPATIENT)
Dept: INTERNAL MEDICINE CLINIC | Age: 57
End: 2022-11-04
Payer: COMMERCIAL

## 2022-11-04 VITALS
BODY MASS INDEX: 20.38 KG/M2 | SYSTOLIC BLOOD PRESSURE: 108 MMHG | WEIGHT: 115 LBS | OXYGEN SATURATION: 96 % | DIASTOLIC BLOOD PRESSURE: 70 MMHG | HEART RATE: 80 BPM | TEMPERATURE: 98.2 F | HEIGHT: 63 IN | RESPIRATION RATE: 16 BRPM

## 2022-11-04 DIAGNOSIS — F43.21 SITUATIONAL DEPRESSION: Primary | ICD-10-CM

## 2022-11-04 PROCEDURE — 99214 OFFICE O/P EST MOD 30 MIN: CPT | Performed by: INTERNAL MEDICINE

## 2022-11-04 RX ORDER — LORAZEPAM 0.5 MG/1
0.25 TABLET ORAL AS NEEDED
Qty: 30 TABLET | Refills: 0 | Status: SHIPPED | OUTPATIENT
Start: 2022-11-04

## 2022-11-04 RX ORDER — MIRTAZAPINE 7.5 MG/1
7.5 TABLET, FILM COATED ORAL
Qty: 30 TABLET | Refills: 1 | Status: SHIPPED | OUTPATIENT
Start: 2022-11-04

## 2022-11-04 NOTE — PROGRESS NOTES
Verified name and birth date for privacy precautions. Chart reviewed in preparation for today's visit.      Chief Complaint   Patient presents with    Grief Counseling    Stress          Health Maintenance Due   Topic    Colorectal Cancer Screening Combo     Shingrix Vaccine Age 50> (1 of 2)    COVID-19 Vaccine (4 - Booster for Carey Peter series)    Flu Vaccine (1)         Wt Readings from Last 3 Encounters:   11/04/22 115 lb (52.2 kg)   06/13/22 114 lb (51.7 kg)   06/09/21 116 lb (52.6 kg)     Temp Readings from Last 3 Encounters:   06/13/22 98.2 °F (36.8 °C) (Temporal)   06/09/21 98.2 °F (36.8 °C) (Temporal)   06/22/20 97.8 °F (36.6 °C) (Temporal)     BP Readings from Last 3 Encounters:   06/13/22 100/65   06/09/21 114/76   06/22/20 122/78     Pulse Readings from Last 3 Encounters:   06/13/22 77   06/09/21 87   06/22/20 89         Learning Assessment:  :     Learning Assessment 3/23/2015 1/13/2014   PRIMARY LEARNER Patient Patient   HIGHEST LEVEL OF EDUCATION - PRIMARY LEARNER  GRADUATED HIGH SCHOOL OR GED -   BARRIERS PRIMARY LEARNER NONE -   PRIMARY LANGUAGE ENGLISH ENGLISH   LEARNER PREFERENCE PRIMARY DEMONSTRATION DEMONSTRATION     READING -   ANSWERED BY patient patient   RELATIONSHIP SELF SELF       Depression Screening:  :     3 most recent PHQ Screens 11/4/2022   Little interest or pleasure in doing things Not at all   Feeling down, depressed, irritable, or hopeless Not at all   Total Score PHQ 2 0   Trouble falling or staying asleep, or sleeping too much -   Feeling tired or having little energy -   Poor appetite, weight loss, or overeating -   Feeling bad about yourself - or that you are a failure or have let yourself or your family down -   Trouble concentrating on things such as school, work, reading, or watching TV -   Moving or speaking so slowly that other people could have noticed; or the opposite being so fidgety that others notice -   Thoughts of being better off dead, or hurting yourself in some way -   PHQ 9 Score -   How difficult have these problems made it for you to do your work, take care of your home and get along with others -       Fall Risk Assessment:  :     No flowsheet data found. Abuse Screening:  :     Abuse Screening Questionnaire 11/4/2022 6/13/2022   Do you ever feel afraid of your partner? N N   Are you in a relationship with someone who physically or mentally threatens you? N N   Is it safe for you to go home?  Mireya Chaudhry

## 2022-11-04 NOTE — PROGRESS NOTES
HPI:  Ashley Landaverde is a 59y.o. year old female who is here for stress and anxiety. Her son unexpectedly committed suicide about 3 weeks ago. She has been struggling since then. She had no clue that he was depressed or anxious. In retrospect she did find that he was more withdrawn. She has had a difficult time with sleeping at night. She has been using some lorazepam in a limited fashion. She denies headaches or dizziness. She does note ongoing issues with palpitations particularly when she is stressed. She has been eating well. Sleep has been poor. She has been doing her regular exercise to keep her mind occupied as well as going to work. Past Medical History:   Diagnosis Date    Allergic rhinitis, cause unspecified        Past Surgical History:   Procedure Laterality Date    HX REFRACTIVE SURGERY      AR EMBOLIZATION UTERINE FIBROID      AR REPAIR ACHILLES Premelyn Fletcher         Prior to Admission medications    Medication Sig Start Date End Date Taking? Authorizing Provider   mirtazapine (REMERON) 7.5 mg tablet Take 1 Tablet by mouth nightly. 11/4/22  Yes Aminah Callahan MD   LORazepam (ATIVAN) 0.5 mg tablet Take 0.5 Tablets by mouth as needed for Anxiety. Max Daily Amount: 24 mg. 11/4/22  Yes Aminah Callahan MD   methIMAzole (TAPAZOLE) 5 mg tablet TAKE 1/2 A TABLET BY MOUTH EVERY DAY 7/11/22  Yes Kevin Aguilar III, MD   hydrocortisone (ANUSOL-HC) 2.5 % rectal cream Insert  into rectum four (4) times daily. 12/20/21  Yes Aminah Callahan MD   psyllium (METAMUCIL) packet Take 1 Packet by mouth daily. Yes Provider, Historical   WILD CHERRY by Does Not Apply route. Twice a week   Yes Provider, Historical   cetirizine (ZYRTEC) 10 mg tablet Take 10 mg by mouth as needed. 6/3/19  Yes Aminah Callahan MD   TURMERIC PO Take  by mouth. Twice a week   Yes Provider, Historical   cholecalciferol (VITAMIN D3) (1000 Units /25 mcg) tablet Take  by mouth daily.    Yes Provider, Historical omega-3 fatty acids-vitamin e 1,000 mg cap Take 1 Cap by mouth BID SAT&SUN. Yes Provider, Historical   multivitamin (ONE A DAY) tablet Take 1 Tab by mouth daily. Yes Provider, Historical   Glucosamine &Chondroit-MV-Min3 291-427-19-0.5 mg tab Take  by mouth. Yes Provider, Historical   Blood-Glucose Meter monitoring kit As directed 6/22/20   Stella Alanis III, MD   LORazepam (ATIVAN) 0.5 mg tablet Take 0.25 mg by mouth as needed for Anxiety.   11/4/22  Provider, Historical       Social History     Socioeconomic History    Marital status:      Spouse name: Not on file    Number of children: Not on file    Years of education: Not on file    Highest education level: Not on file   Occupational History    Not on file   Tobacco Use    Smoking status: Never    Smokeless tobacco: Never   Vaping Use    Vaping Use: Never used   Substance and Sexual Activity    Alcohol use: No    Drug use: No    Sexual activity: Yes     Partners: Male   Other Topics Concern    Not on file   Social History Narrative    Not on file     Social Determinants of Health     Financial Resource Strain: Not on file   Food Insecurity: Not on file   Transportation Needs: Not on file   Physical Activity: Not on file   Stress: Not on file   Social Connections: Not on file   Intimate Partner Violence: Not on file   Housing Stability: Not on file          ROS  Per HPI    Visit Vitals  /70   Pulse 80   Temp 98.2 °F (36.8 °C) (Temporal)   Resp 16   Ht 5' 3\" (1.6 m)   Wt 115 lb (52.2 kg)   LMP 02/13/2012   SpO2 96%   BMI 20.37 kg/m²         Physical Exam   Physical Examination: General appearance - alert, well appearing, and in no distress, anxious, and crying  Mental status - alert, oriented to person, place, and time  Chest - clear to auscultation, no wheezes, rales or rhonchi, symmetric air entry  Heart - normal rate, regular rhythm, normal S1, S2, no murmurs, rubs, clicks or gallops  Neurological - alert, oriented, normal speech, no focal findings or movement disorder noted      Assessment/Plan:  Diagnoses and all orders for this visit:    1. Situational depression-related to the loss of her son. At this point would try mirtazapine at night to see if it helps with calming. She is very concerned about habituation with lorazepam and will limit that use. Also discussed with her the possibility of counseling and she was given the name of [facility]. She will let me know in 2 weeks how she is doing. This visit was 35 minutes in duration all of which was done discussing the above. -     LORazepam (ATIVAN) 0.5 mg tablet; Take 0.5 Tablets by mouth as needed for Anxiety. Max Daily Amount: 24 mg. Other orders  -     mirtazapine (REMERON) 7.5 mg tablet; Take 1 Tablet by mouth nightly. Advised her to call back or return to office if symptoms worsen/change/persist.  Discussed expected course/resolution/complications of diagnosis in detail with patient. Medication risks/benefits/costs/interactions/alternatives discussed with patient. She was given an after visit summary which includes diagnoses, current medications, & vitals. She expressed understanding with the diagnosis and plan.

## 2023-02-07 RX ORDER — HYDROCORTISONE 25 MG/G
CREAM TOPICAL
Qty: 30 G | Refills: 1 | Status: SHIPPED | OUTPATIENT
Start: 2023-02-07

## 2023-04-27 ENCOUNTER — PATIENT MESSAGE (OUTPATIENT)
Dept: INTERNAL MEDICINE CLINIC | Age: 58
End: 2023-04-27

## 2023-04-28 DIAGNOSIS — Z12.11 COLON CANCER SCREENING: Primary | ICD-10-CM

## 2023-06-26 ENCOUNTER — OFFICE VISIT (OUTPATIENT)
Age: 58
End: 2023-06-26
Payer: COMMERCIAL

## 2023-06-26 VITALS
SYSTOLIC BLOOD PRESSURE: 108 MMHG | OXYGEN SATURATION: 97 % | DIASTOLIC BLOOD PRESSURE: 70 MMHG | WEIGHT: 113 LBS | HEIGHT: 63 IN | BODY MASS INDEX: 20.02 KG/M2 | RESPIRATION RATE: 16 BRPM | TEMPERATURE: 98 F | HEART RATE: 77 BPM

## 2023-06-26 DIAGNOSIS — Z00.00 ENCOUNTER FOR WELL ADULT EXAM WITHOUT ABNORMAL FINDINGS: Primary | ICD-10-CM

## 2023-06-26 DIAGNOSIS — R00.2 PALPITATIONS: ICD-10-CM

## 2023-06-26 DIAGNOSIS — E05.80 OTHER THYROTOXICOSIS WITHOUT THYROTOXIC CRISIS OR STORM: ICD-10-CM

## 2023-06-26 LAB
ALBUMIN SERPL-MCNC: 4.2 G/DL (ref 3.5–5)
ALBUMIN/GLOB SERPL: 1.1 (ref 1.1–2.2)
ALP SERPL-CCNC: 81 U/L (ref 45–117)
ALT SERPL-CCNC: 23 U/L (ref 12–78)
ANION GAP SERPL CALC-SCNC: 3 MMOL/L (ref 5–15)
AST SERPL-CCNC: 24 U/L (ref 15–37)
BASOPHILS # BLD: 0 K/UL (ref 0–0.1)
BASOPHILS NFR BLD: 1 % (ref 0–1)
BILIRUB SERPL-MCNC: 0.5 MG/DL (ref 0.2–1)
BUN SERPL-MCNC: 12 MG/DL (ref 6–20)
BUN/CREAT SERPL: 18 (ref 12–20)
CALCIUM SERPL-MCNC: 9.5 MG/DL (ref 8.5–10.1)
CHLORIDE SERPL-SCNC: 105 MMOL/L (ref 97–108)
CHOLEST SERPL-MCNC: 174 MG/DL
CO2 SERPL-SCNC: 31 MMOL/L (ref 21–32)
CREAT SERPL-MCNC: 0.66 MG/DL (ref 0.55–1.02)
DIFFERENTIAL METHOD BLD: NORMAL
EOSINOPHIL # BLD: 0.1 K/UL (ref 0–0.4)
EOSINOPHIL NFR BLD: 1 % (ref 0–7)
ERYTHROCYTE [DISTWIDTH] IN BLOOD BY AUTOMATED COUNT: 11.9 % (ref 11.5–14.5)
GLOBULIN SER CALC-MCNC: 4 G/DL (ref 2–4)
GLUCOSE SERPL-MCNC: 97 MG/DL (ref 65–100)
HCT VFR BLD AUTO: 41.8 % (ref 35–47)
HDLC SERPL-MCNC: 65 MG/DL
HDLC SERPL: 2.7 (ref 0–5)
HGB BLD-MCNC: 13.2 G/DL (ref 11.5–16)
IMM GRANULOCYTES # BLD AUTO: 0 K/UL (ref 0–0.04)
IMM GRANULOCYTES NFR BLD AUTO: 0 % (ref 0–0.5)
LDLC SERPL CALC-MCNC: 94.8 MG/DL (ref 0–100)
LYMPHOCYTES # BLD: 2 K/UL (ref 0.8–3.5)
LYMPHOCYTES NFR BLD: 28 % (ref 12–49)
MCH RBC QN AUTO: 30.3 PG (ref 26–34)
MCHC RBC AUTO-ENTMCNC: 31.6 G/DL (ref 30–36.5)
MCV RBC AUTO: 96.1 FL (ref 80–99)
MONOCYTES # BLD: 0.6 K/UL (ref 0–1)
MONOCYTES NFR BLD: 8 % (ref 5–13)
NEUTS SEG # BLD: 4.5 K/UL (ref 1.8–8)
NEUTS SEG NFR BLD: 62 % (ref 32–75)
NRBC # BLD: 0 K/UL (ref 0–0.01)
NRBC BLD-RTO: 0 PER 100 WBC
PLATELET # BLD AUTO: 299 K/UL (ref 150–400)
PMV BLD AUTO: 9.7 FL (ref 8.9–12.9)
POTASSIUM SERPL-SCNC: 4.2 MMOL/L (ref 3.5–5.1)
PROT SERPL-MCNC: 8.2 G/DL (ref 6.4–8.2)
RBC # BLD AUTO: 4.35 M/UL (ref 3.8–5.2)
SODIUM SERPL-SCNC: 139 MMOL/L (ref 136–145)
T4 FREE SERPL-MCNC: 1.4 NG/DL (ref 0.8–1.5)
TRIGL SERPL-MCNC: 71 MG/DL
TSH SERPL DL<=0.05 MIU/L-ACNC: 0.45 UIU/ML (ref 0.36–3.74)
VLDLC SERPL CALC-MCNC: 14.2 MG/DL
WBC # BLD AUTO: 7.2 K/UL (ref 3.6–11)

## 2023-06-26 PROCEDURE — 99396 PREV VISIT EST AGE 40-64: CPT | Performed by: INTERNAL MEDICINE

## 2023-06-26 SDOH — ECONOMIC STABILITY: FOOD INSECURITY: WITHIN THE PAST 12 MONTHS, YOU WORRIED THAT YOUR FOOD WOULD RUN OUT BEFORE YOU GOT MONEY TO BUY MORE.: NEVER TRUE

## 2023-06-26 SDOH — ECONOMIC STABILITY: INCOME INSECURITY: HOW HARD IS IT FOR YOU TO PAY FOR THE VERY BASICS LIKE FOOD, HOUSING, MEDICAL CARE, AND HEATING?: NOT HARD AT ALL

## 2023-06-26 SDOH — ECONOMIC STABILITY: HOUSING INSECURITY
IN THE LAST 12 MONTHS, WAS THERE A TIME WHEN YOU DID NOT HAVE A STEADY PLACE TO SLEEP OR SLEPT IN A SHELTER (INCLUDING NOW)?: NO

## 2023-06-26 SDOH — ECONOMIC STABILITY: FOOD INSECURITY: WITHIN THE PAST 12 MONTHS, THE FOOD YOU BOUGHT JUST DIDN'T LAST AND YOU DIDN'T HAVE MONEY TO GET MORE.: NEVER TRUE

## 2023-06-26 ASSESSMENT — PATIENT HEALTH QUESTIONNAIRE - PHQ9
2. FEELING DOWN, DEPRESSED OR HOPELESS: 0
SUM OF ALL RESPONSES TO PHQ9 QUESTIONS 1 & 2: 0
SUM OF ALL RESPONSES TO PHQ QUESTIONS 1-9: 0
1. LITTLE INTEREST OR PLEASURE IN DOING THINGS: 0

## 2023-09-15 ENCOUNTER — TELEPHONE (OUTPATIENT)
Age: 58
End: 2023-09-15

## 2023-09-15 NOTE — TELEPHONE ENCOUNTER
----- Message from Teresa Salas sent at 9/15/2023 10:00 AM EDT -----  Subject: Message to Provider    QUESTIONS  Information for Provider? Patient phoned states was in office on 6/26/23   for her wellness exam - however, labs were not coded correctly and patient   has now been billed for lab services (billing advised her to contact   office); please call patient to advise  ---------------------------------------------------------------------------  --------------  [address]  [phone]; OK to leave message on voicemail  ---------------------------------------------------------------------------  --------------  SCRIPT ANSWERS  Relationship to Patient?  Self

## 2023-10-11 DIAGNOSIS — Z00.00 ENCOUNTER FOR GENERAL ADULT MEDICAL EXAMINATION WITHOUT ABNORMAL FINDINGS: ICD-10-CM

## 2023-10-11 RX ORDER — METHIMAZOLE 5 MG/1
2.5 TABLET ORAL DAILY
Qty: 15 TABLET | Refills: 3 | Status: SHIPPED | OUTPATIENT
Start: 2023-10-11

## 2024-07-01 ENCOUNTER — OFFICE VISIT (OUTPATIENT)
Age: 59
End: 2024-07-01
Payer: COMMERCIAL

## 2024-07-01 VITALS
RESPIRATION RATE: 15 BRPM | WEIGHT: 121 LBS | DIASTOLIC BLOOD PRESSURE: 67 MMHG | HEIGHT: 63 IN | OXYGEN SATURATION: 98 % | TEMPERATURE: 98 F | SYSTOLIC BLOOD PRESSURE: 102 MMHG | HEART RATE: 79 BPM | BODY MASS INDEX: 21.44 KG/M2

## 2024-07-01 DIAGNOSIS — E05.80 OTHER THYROTOXICOSIS WITHOUT THYROTOXIC CRISIS OR STORM: ICD-10-CM

## 2024-07-01 DIAGNOSIS — Z00.00 ENCOUNTER FOR WELL ADULT EXAM WITHOUT ABNORMAL FINDINGS: Primary | ICD-10-CM

## 2024-07-01 DIAGNOSIS — Z71.89 ACP (ADVANCE CARE PLANNING): ICD-10-CM

## 2024-07-01 DIAGNOSIS — R00.2 PALPITATIONS: ICD-10-CM

## 2024-07-01 PROCEDURE — 99396 PREV VISIT EST AGE 40-64: CPT | Performed by: INTERNAL MEDICINE

## 2024-07-01 RX ORDER — LORAZEPAM 0.5 MG/1
0.5 TABLET ORAL EVERY 8 HOURS PRN
COMMUNITY
Start: 2024-07-01

## 2024-07-01 RX ORDER — MULTIVIT-MIN/IRON/FOLIC ACID/K 18-600-40
CAPSULE ORAL
COMMUNITY

## 2024-07-01 RX ORDER — M-VIT,TX,IRON,MINS/CALC/FOLIC 27MG-0.4MG
1 TABLET ORAL DAILY
COMMUNITY

## 2024-07-01 RX ORDER — GLUCOSAMINE SULFATE 500 MG
CAPSULE ORAL
COMMUNITY

## 2024-07-01 SDOH — ECONOMIC STABILITY: FOOD INSECURITY: WITHIN THE PAST 12 MONTHS, YOU WORRIED THAT YOUR FOOD WOULD RUN OUT BEFORE YOU GOT MONEY TO BUY MORE.: NEVER TRUE

## 2024-07-01 SDOH — ECONOMIC STABILITY: FOOD INSECURITY: WITHIN THE PAST 12 MONTHS, THE FOOD YOU BOUGHT JUST DIDN'T LAST AND YOU DIDN'T HAVE MONEY TO GET MORE.: NEVER TRUE

## 2024-07-01 SDOH — ECONOMIC STABILITY: INCOME INSECURITY: HOW HARD IS IT FOR YOU TO PAY FOR THE VERY BASICS LIKE FOOD, HOUSING, MEDICAL CARE, AND HEATING?: NOT HARD AT ALL

## 2024-07-01 ASSESSMENT — PATIENT HEALTH QUESTIONNAIRE - PHQ9
SUM OF ALL RESPONSES TO PHQ QUESTIONS 1-9: 1
2. FEELING DOWN, DEPRESSED OR HOPELESS: SEVERAL DAYS
SUM OF ALL RESPONSES TO PHQ9 QUESTIONS 1 & 2: 1
SUM OF ALL RESPONSES TO PHQ QUESTIONS 1-9: 1
1. LITTLE INTEREST OR PLEASURE IN DOING THINGS: NOT AT ALL

## 2024-07-01 NOTE — PROGRESS NOTES
Well Adult Note  Name: Raul Uriarte Today’s Date: 2024   MRN: [id number] Sex: Female   Age: 58 y.o. Ethnicity: Declined   : 1965 Race: Declined      Raul Uriarte is here for well adult exam.  History:  Presents for a wellness exam and a follow-up.  Overall she has been well.  She has gained some weight recently.  She continues to be quite active but not as much structured exercise.  She continues to have some episodes of palpitation that are very brief and resolved.  They seem to be related to anxiety issues.    Review of Systems  Per HPI  Allergies   Allergen Reactions    Midazolam Nausea And Vomiting         Prior to Visit Medications    Medication Sig Taking? Authorizing Provider   Cholecalciferol (VITAMIN D) 50 MCG (2000) CAPS capsule Take by mouth Yes Roosevelt Zamorano MD   Glucosamine 500 MG CAPS Take by mouth Yes Roosevelt Zamorano MD   Omega-3 Fatty Acids (FISH OIL) 300 MG CAPS Take by mouth Yes Roosevelt Zamorano MD   Multiple Vitamins-Minerals (THERAPEUTIC MULTIVITAMIN-MINERALS) tablet Take 1 tablet by mouth daily Yes Roosevelt Zamorano MD   Calcium Carb-Cholecalciferol (CALCIUM PLUS VITAMIN D3 PO) Take 0.5 tablets by mouth daily Yes Roosevelt Zamorano MD   Probiotic Product (PROBIOTIC-10 PO) Take 1 capsule by mouth daily Yes Roosevelt Zamorano MD   TURMERIC PO Take 1 capsule by mouth daily Yes Automatic Reconciliation, Ar   hydrocortisone 2.5 % cream INSERT INTO RECTUM FOUR TIMES DAILY. Yes Automatic Reconciliation, Ar   methIMAzole (TAPAZOLE) 5 MG tablet TAKE 1/2 TABLET BY MOUTH EVERY DAY  Patient not taking: Reported on 2024  Martell Pratt MD   cetirizine (ZYRTEC) 10 MG tablet Take by mouth as needed  Patient not taking: Reported on 2024  Automatic Reconciliation, Ar   LORazepam (ATIVAN) 0.5 MG tablet Take by mouth as needed.  Patient not taking: Reported on 2024  Automatic Reconciliation, Ar         Past Medical History:   Diagnosis Date

## 2024-07-02 LAB
ALBUMIN SERPL-MCNC: 4.8 G/DL (ref 3.8–4.9)
ALP SERPL-CCNC: 88 IU/L (ref 44–121)
ALT SERPL-CCNC: 23 IU/L (ref 0–32)
AST SERPL-CCNC: 26 IU/L (ref 0–40)
BASOPHILS # BLD AUTO: 0.1 X10E3/UL (ref 0–0.2)
BASOPHILS NFR BLD AUTO: 1 %
BILIRUB SERPL-MCNC: 0.4 MG/DL (ref 0–1.2)
BUN SERPL-MCNC: 15 MG/DL (ref 8–27)
BUN/CREAT SERPL: 23 (ref 12–28)
CALCIUM SERPL-MCNC: 9.9 MG/DL (ref 8.7–10.3)
CHLORIDE SERPL-SCNC: 101 MMOL/L (ref 96–106)
CHOLEST SERPL-MCNC: 197 MG/DL (ref 100–199)
CO2 SERPL-SCNC: 26 MMOL/L (ref 20–29)
CREAT SERPL-MCNC: 0.66 MG/DL (ref 0.57–1)
EGFRCR SERPLBLD CKD-EPI 2021: 100 ML/MIN/1.73
EOSINOPHIL # BLD AUTO: 0.1 X10E3/UL (ref 0–0.4)
EOSINOPHIL NFR BLD AUTO: 1 %
ERYTHROCYTE [DISTWIDTH] IN BLOOD BY AUTOMATED COUNT: 11.9 % (ref 11.7–15.4)
GLOBULIN SER CALC-MCNC: 3.9 G/DL (ref 1.5–4.5)
GLUCOSE SERPL-MCNC: 94 MG/DL (ref 70–99)
HCT VFR BLD AUTO: 42.9 % (ref 34–46.6)
HDLC SERPL-MCNC: 69 MG/DL
HGB BLD-MCNC: 14.4 G/DL (ref 11.1–15.9)
IMM GRANULOCYTES # BLD AUTO: 0 X10E3/UL (ref 0–0.1)
IMM GRANULOCYTES NFR BLD AUTO: 0 %
LDLC SERPL CALC-MCNC: 113 MG/DL (ref 0–99)
LYMPHOCYTES # BLD AUTO: 1.9 X10E3/UL (ref 0.7–3.1)
LYMPHOCYTES NFR BLD AUTO: 27 %
MCH RBC QN AUTO: 30.9 PG (ref 26.6–33)
MCHC RBC AUTO-ENTMCNC: 33.6 G/DL (ref 31.5–35.7)
MCV RBC AUTO: 92 FL (ref 79–97)
MONOCYTES # BLD AUTO: 0.4 X10E3/UL (ref 0.1–0.9)
MONOCYTES NFR BLD AUTO: 6 %
NEUTROPHILS # BLD AUTO: 4.6 X10E3/UL (ref 1.4–7)
NEUTROPHILS NFR BLD AUTO: 65 %
PLATELET # BLD AUTO: 288 X10E3/UL (ref 150–450)
POTASSIUM SERPL-SCNC: 5.1 MMOL/L (ref 3.5–5.2)
PROT SERPL-MCNC: 8.7 G/DL (ref 6–8.5)
RBC # BLD AUTO: 4.66 X10E6/UL (ref 3.77–5.28)
SODIUM SERPL-SCNC: 140 MMOL/L (ref 134–144)
T4 FREE SERPL-MCNC: 1.63 NG/DL (ref 0.82–1.77)
TRIGL SERPL-MCNC: 83 MG/DL (ref 0–149)
TSH SERPL DL<=0.005 MIU/L-ACNC: 0.68 UIU/ML (ref 0.45–4.5)
VLDLC SERPL CALC-MCNC: 15 MG/DL (ref 5–40)
WBC # BLD AUTO: 7 X10E3/UL (ref 3.4–10.8)

## 2024-12-18 RX ORDER — HYDROCORTISONE 25 MG/G
CREAM TOPICAL
Qty: 30 G | Refills: 1 | Status: SHIPPED | OUTPATIENT
Start: 2024-12-18

## 2024-12-18 NOTE — TELEPHONE ENCOUNTER
Chief Complaint   Patient presents with    Medication Refill     Last Appointment with Dr. Martell Pratt:  7/1/2024   Future Appointments   Date Time Provider Department Center   7/7/2025  9:00 AM Martell Pratt MD [facility] DE[facility]

## 2025-07-07 ENCOUNTER — OFFICE VISIT (OUTPATIENT)
Age: 60
End: 2025-07-07
Payer: COMMERCIAL

## 2025-07-07 VITALS
HEIGHT: 63 IN | RESPIRATION RATE: 16 BRPM | TEMPERATURE: 98.7 F | OXYGEN SATURATION: 97 % | SYSTOLIC BLOOD PRESSURE: 117 MMHG | WEIGHT: 121 LBS | DIASTOLIC BLOOD PRESSURE: 74 MMHG | HEART RATE: 72 BPM | BODY MASS INDEX: 21.44 KG/M2

## 2025-07-07 DIAGNOSIS — Z00.00 ENCOUNTER FOR WELL ADULT EXAM WITHOUT ABNORMAL FINDINGS: Primary | ICD-10-CM

## 2025-07-07 DIAGNOSIS — E05.80 OTHER THYROTOXICOSIS WITHOUT THYROTOXIC CRISIS OR STORM: ICD-10-CM

## 2025-07-07 PROCEDURE — 99396 PREV VISIT EST AGE 40-64: CPT | Performed by: INTERNAL MEDICINE

## 2025-07-07 SDOH — ECONOMIC STABILITY: FOOD INSECURITY: WITHIN THE PAST 12 MONTHS, YOU WORRIED THAT YOUR FOOD WOULD RUN OUT BEFORE YOU GOT MONEY TO BUY MORE.: NEVER TRUE

## 2025-07-07 SDOH — ECONOMIC STABILITY: FOOD INSECURITY: WITHIN THE PAST 12 MONTHS, THE FOOD YOU BOUGHT JUST DIDN'T LAST AND YOU DIDN'T HAVE MONEY TO GET MORE.: NEVER TRUE

## 2025-07-07 ASSESSMENT — PATIENT HEALTH QUESTIONNAIRE - PHQ9
SUM OF ALL RESPONSES TO PHQ QUESTIONS 1-9: 0
2. FEELING DOWN, DEPRESSED OR HOPELESS: NOT AT ALL
SUM OF ALL RESPONSES TO PHQ QUESTIONS 1-9: 0
1. LITTLE INTEREST OR PLEASURE IN DOING THINGS: NOT AT ALL
SUM OF ALL RESPONSES TO PHQ QUESTIONS 1-9: 0
SUM OF ALL RESPONSES TO PHQ QUESTIONS 1-9: 0

## 2025-07-07 NOTE — PROGRESS NOTES
capsule by mouth daily Yes Automatic Reconciliation, Ar     Past Medical History:   Diagnosis Date    Allergic rhinitis, cause unspecified      Past Surgical History:   Procedure Laterality Date    EMBOLIZATION UTERINE FIBROID      REFRACTIVE SURGERY      REPAIR ACHILLES TENDON,PRIMARY       Family History   Problem Relation Age of Onset    Suicide Son     Allergic Rhinitis Son     Elevated Lipids Father      Social History     Tobacco Use    Smoking status: Never    Smokeless tobacco: Never   Substance Use Topics    Alcohol use: No    Drug use: No           Objective    Vital Signs  /74   Pulse 72   Temp 98.7 °F (37.1 °C) (Temporal)   Resp 16   Ht 1.6 m (5' 3\")   Wt 54.9 kg (121 lb)   SpO2 97%   BMI 21.43 kg/m²     Wt Readings from Last 3 Encounters:   07/07/25 54.9 kg (121 lb)   07/01/24 54.9 kg (121 lb)   06/26/23 51.3 kg (113 lb)       Physical Exam   Physical Examination: General appearance - alert, well appearing, and in no distress  Mental status - alert, oriented to person, place, and time  Ears - bilateral TM's and external ear canals normal  Mouth - mucous membranes moist, pharynx normal without lesions  Neck - supple, no significant adenopathy  Lymphatics - no palpable lymphadenopathy, no hepatosplenomegaly  Chest - clear to auscultation, no wheezes, rales or rhonchi, symmetric air entry  Heart - normal rate, regular rhythm, normal S1, S2, no murmurs, rubs, clicks or gallops  Abdomen - soft, nontender, nondistended, no masses or organomegaly  Neurological - alert, oriented, normal speech, no focal findings or movement disorder noted  Musculoskeletal - no joint tenderness, deformity or swelling  Extremities - peripheral pulses normal, no pedal edema, no clubbing or cyanosis       Personalized Preventive Plan  Current Health Maintenance Status  Immunization History   Administered Date(s) Administered    COVID-19, MODERNA, 2024/25, (age 12y+), IM, 50mcg/0.5mL 10/20/2023    COVID-19, PFIZER

## 2025-07-08 ENCOUNTER — RESULTS FOLLOW-UP (OUTPATIENT)
Age: 60
End: 2025-07-08

## 2025-07-08 LAB
ALBUMIN SERPL-MCNC: 4.8 G/DL (ref 3.9–4.9)
ALP SERPL-CCNC: 102 IU/L (ref 44–121)
ALT SERPL-CCNC: 18 IU/L (ref 0–32)
AST SERPL-CCNC: 26 IU/L (ref 0–40)
BASOPHILS # BLD AUTO: 0.1 X10E3/UL (ref 0–0.2)
BASOPHILS NFR BLD AUTO: 1 %
BILIRUB SERPL-MCNC: 0.5 MG/DL (ref 0–1.2)
BUN SERPL-MCNC: 14 MG/DL (ref 8–27)
BUN/CREAT SERPL: 19 (ref 12–28)
CALCIUM SERPL-MCNC: 10 MG/DL (ref 8.7–10.3)
CHLORIDE SERPL-SCNC: 101 MMOL/L (ref 96–106)
CHOLEST SERPL-MCNC: 195 MG/DL (ref 100–199)
CO2 SERPL-SCNC: 22 MMOL/L (ref 20–29)
CREAT SERPL-MCNC: 0.73 MG/DL (ref 0.57–1)
EGFRCR SERPLBLD CKD-EPI 2021: 94 ML/MIN/1.73
EOSINOPHIL # BLD AUTO: 0.1 X10E3/UL (ref 0–0.4)
EOSINOPHIL NFR BLD AUTO: 1 %
ERYTHROCYTE [DISTWIDTH] IN BLOOD BY AUTOMATED COUNT: 12.1 % (ref 11.7–15.4)
GLOBULIN SER CALC-MCNC: 3.8 G/DL (ref 1.5–4.5)
GLUCOSE SERPL-MCNC: 86 MG/DL (ref 70–99)
HCT VFR BLD AUTO: 42.9 % (ref 34–46.6)
HDLC SERPL-MCNC: 70 MG/DL
HGB BLD-MCNC: 14.1 G/DL (ref 11.1–15.9)
IMM GRANULOCYTES # BLD AUTO: 0 X10E3/UL (ref 0–0.1)
IMM GRANULOCYTES NFR BLD AUTO: 0 %
LDLC SERPL CALC-MCNC: 108 MG/DL (ref 0–99)
LYMPHOCYTES # BLD AUTO: 2.1 X10E3/UL (ref 0.7–3.1)
LYMPHOCYTES NFR BLD AUTO: 23 %
MCH RBC QN AUTO: 30.3 PG (ref 26.6–33)
MCHC RBC AUTO-ENTMCNC: 32.9 G/DL (ref 31.5–35.7)
MCV RBC AUTO: 92 FL (ref 79–97)
MONOCYTES # BLD AUTO: 0.6 X10E3/UL (ref 0.1–0.9)
MONOCYTES NFR BLD AUTO: 6 %
NEUTROPHILS # BLD AUTO: 6.4 X10E3/UL (ref 1.4–7)
NEUTROPHILS NFR BLD AUTO: 69 %
PLATELET # BLD AUTO: 296 X10E3/UL (ref 150–450)
POTASSIUM SERPL-SCNC: 4.9 MMOL/L (ref 3.5–5.2)
PROT SERPL-MCNC: 8.6 G/DL (ref 6–8.5)
RBC # BLD AUTO: 4.66 X10E6/UL (ref 3.77–5.28)
SODIUM SERPL-SCNC: 139 MMOL/L (ref 134–144)
T4 FREE SERPL-MCNC: 1.81 NG/DL (ref 0.82–1.77)
TRIGL SERPL-MCNC: 94 MG/DL (ref 0–149)
TSH SERPL DL<=0.005 MIU/L-ACNC: 0.49 UIU/ML (ref 0.45–4.5)
VLDLC SERPL CALC-MCNC: 17 MG/DL (ref 5–40)
WBC # BLD AUTO: 9.2 X10E3/UL (ref 3.4–10.8)